# Patient Record
Sex: FEMALE | Race: WHITE | NOT HISPANIC OR LATINO | Employment: FULL TIME | URBAN - METROPOLITAN AREA
[De-identification: names, ages, dates, MRNs, and addresses within clinical notes are randomized per-mention and may not be internally consistent; named-entity substitution may affect disease eponyms.]

---

## 2024-09-12 ENCOUNTER — APPOINTMENT (OUTPATIENT)
Dept: LAB | Facility: CLINIC | Age: 34
End: 2024-09-12

## 2024-09-12 DIAGNOSIS — Z11.1 TUBERCULOSIS SCREENING: ICD-10-CM

## 2024-09-12 DIAGNOSIS — Z02.1 PRE-EMPLOYMENT EXAMINATION: ICD-10-CM

## 2024-09-12 PROCEDURE — 86480 TB TEST CELL IMMUN MEASURE: CPT

## 2024-09-12 PROCEDURE — 36415 COLL VENOUS BLD VENIPUNCTURE: CPT

## 2024-09-13 LAB
GAMMA INTERFERON BACKGROUND BLD IA-ACNC: 0.01 IU/ML
M TB IFN-G BLD-IMP: NEGATIVE
M TB IFN-G CD4+ BCKGRND COR BLD-ACNC: 0 IU/ML
M TB IFN-G CD4+ BCKGRND COR BLD-ACNC: 0 IU/ML
MITOGEN IGNF BCKGRD COR BLD-ACNC: 9.99 IU/ML

## 2024-09-16 ENCOUNTER — OFFICE VISIT (OUTPATIENT)
Dept: URGENT CARE | Facility: MEDICAL CENTER | Age: 34
End: 2024-09-16
Payer: COMMERCIAL

## 2024-09-16 VITALS
HEART RATE: 82 BPM | TEMPERATURE: 97.9 F | BODY MASS INDEX: 51.44 KG/M2 | OXYGEN SATURATION: 97 % | SYSTOLIC BLOOD PRESSURE: 110 MMHG | WEIGHT: 263.4 LBS | RESPIRATION RATE: 18 BRPM | DIASTOLIC BLOOD PRESSURE: 87 MMHG

## 2024-09-16 DIAGNOSIS — J02.9 SORE THROAT: ICD-10-CM

## 2024-09-16 DIAGNOSIS — B34.9 VIRAL ILLNESS: Primary | ICD-10-CM

## 2024-09-16 LAB — S PYO AG THROAT QL: NEGATIVE

## 2024-09-16 PROCEDURE — 99212 OFFICE O/P EST SF 10 MIN: CPT | Performed by: PHYSICIAN ASSISTANT

## 2024-09-16 PROCEDURE — 87070 CULTURE OTHR SPECIMN AEROBIC: CPT | Performed by: PHYSICIAN ASSISTANT

## 2024-09-16 PROCEDURE — 87880 STREP A ASSAY W/OPTIC: CPT | Performed by: PHYSICIAN ASSISTANT

## 2024-09-16 NOTE — PROGRESS NOTES
[facility]        NAME: aKry Sandoval is a 34 y.o. female  : 1990    MRN: [id number]  DATE: 2024  TIME: 10:33 AM    Assessment and Plan   Viral illness [B34.9]  1. Viral illness        2. Sore throat  POCT rapid ANTIGEN strepA    Throat culture            Patient Instructions     If symptoms progress test yourself for Covid on day 3 of symptoms of later  Tylenol or Ibuprofen as needed for fever or pain  Drink plenty of fluids  Over the Counter cold medication to control symptoms  If symptoms fail to improve follow up with PCP  If symptoms worsen have yourself rechecked    Follow up with PCP in 3-5 days.  Proceed to  ER if symptoms worsen.    If tests have been performed at [facility], our office will contact you with results if changes need to be made to the care plan discussed with you at the visit.  [facility]    Chief Complaint     Chief Complaint   Patient presents with    Sore Throat     Sore throat, body aches, headache, symptoms started today         History of Present Illness       Patient presents with sore throat, cough, body aches and headaches which started this morning.  Son has similar symptoms.  Patient denies fever, runny, stuffy nose, nausea, vomiting or diarrhea.  Point-of-care strep test negative.        Review of Systems   Review of Systems   Constitutional:  Negative for fever.   HENT:  Positive for sore throat. Negative for congestion and rhinorrhea.    Respiratory:  Positive for cough.    Gastrointestinal:  Negative for diarrhea, nausea and vomiting.   Musculoskeletal:  Positive for myalgias.   Neurological:  Positive for headaches.         Current Medications       Current Outpatient Medications:     acetaminophen (TYLENOL) 500 mg tablet, Take 1 tablet (500 mg total) by mouth every 6 (six) hours as needed for mild pain, Disp: 30 tablet, Rfl: 0    albuterol (2.5 mg/3 mL) 0.083 % nebulizer solution, Take 3 mL (2.5 mg  total) by nebulization every 6 (six) hours as needed for wheezing or shortness of breath (Patient not taking: Reported on 9/16/2024), Disp: 90 mL, Rfl: 0    albuterol (Ventolin HFA) 90 mcg/act inhaler, Inhale 2 puffs every 6 (six) hours as needed for wheezing (Patient not taking: Reported on 9/16/2024), Disp: 18 g, Rfl: 0    guaiFENesin (MUCINEX) 600 mg 12 hr tablet, Take 1,200 mg by mouth every 12 (twelve) hours (Patient not taking: Reported on 9/16/2024), Disp: , Rfl:     methocarbamol (ROBAXIN) 750 mg tablet, Take 1 tablet (750 mg total) by mouth every 8 (eight) hours as needed for muscle spasms (Patient not taking: Reported on 9/16/2024), Disp: 21 tablet, Rfl: 0    methylPREDNISolone 4 MG tablet therapy pack, Use as directed on package (Patient not taking: Reported on 9/16/2024), Disp: 21 tablet, Rfl: 0    naproxen (Naprosyn) 500 mg tablet, Take 1 tablet (500 mg total) by mouth 2 (two) times a day with meals (Patient not taking: Reported on 2/1/2024), Disp: 30 tablet, Rfl: 0    Current Allergies     Allergies as of 09/16/2024 - Reviewed 09/16/2024   Allergen Reaction Noted    Penicillins Anaphylaxis 09/27/2016    Fish-derived products - food allergy  04/28/2017    Shellfish allergy - food allergy Hives 04/28/2017            The following portions of the patient's history were reviewed and updated as appropriate: allergies, current medications, past family history, past medical history, past social history, past surgical history and problem list.     Past Medical History:   Diagnosis Date    Arthritis     Depressed        Past Surgical History:   Procedure Laterality Date    CHOLANGIOGRAM N/A 09/21/2019    Procedure: CHOLANGIOGRAM;  Surgeon: Eliot Ramesh MD;  Location: [facility];  Service: General    CHOLECYSTECTOMY LAPAROSCOPIC N/A 09/21/2019    Procedure: CHOLECYSTECTOMY LAPAROSCOPIC;  Surgeon: Eliot Ramesh MD;  Location: [facility];  Service: General    FRACTURE SURGERY         Family History    Problem Relation Age of Onset    Cancer Maternal Uncle     Cancer Maternal Grandfather     Cancer Paternal Grandmother          Medications have been verified.        Objective   /87   Pulse 82   Temp 97.9 °F (36.6 °C)   Resp 18   Wt 119 kg (263 lb 6.4 oz)   SpO2 97%   BMI 51.44 kg/m²   No LMP recorded.       Physical Exam     Physical Exam  Vitals and nursing note reviewed.   Constitutional:       Appearance: She is well-developed.   HENT:      Head: Normocephalic and atraumatic.      Right Ear: Tympanic membrane normal.      Left Ear: Tympanic membrane normal.      Mouth/Throat:      Mouth: Mucous membranes are moist.   Eyes:      Conjunctiva/sclera: Conjunctivae normal.   Cardiovascular:      Rate and Rhythm: Normal rate and regular rhythm.      Heart sounds: Normal heart sounds.   Pulmonary:      Effort: Pulmonary effort is normal.      Breath sounds: Normal breath sounds.   Musculoskeletal:      Cervical back: Neck supple.   Lymphadenopathy:      Cervical: No cervical adenopathy.   Skin:     General: Skin is warm.   Neurological:      Mental Status: She is alert.

## 2024-09-16 NOTE — PATIENT INSTRUCTIONS
If symptoms progress test yourself for Covid on day 3 of symptoms of later  Tylenol or Ibuprofen as needed for fever or pain  Drink plenty of fluids  Over the Counter cold medication to control symptoms  If symptoms fail to improve follow up with PCP  If symptoms worsen have yourself rechecked    If tests have been performed at [facility], our office will contact you with results if changes need to be made to the care plan discussed with you at the visit.  You can review your full results on [facility]

## 2024-09-18 LAB — BACTERIA THROAT CULT: NORMAL

## 2024-10-20 ENCOUNTER — OFFICE VISIT (OUTPATIENT)
Dept: URGENT CARE | Facility: MEDICAL CENTER | Age: 34
End: 2024-10-20
Payer: COMMERCIAL

## 2024-10-20 VITALS
SYSTOLIC BLOOD PRESSURE: 118 MMHG | OXYGEN SATURATION: 99 % | WEIGHT: 272 LBS | TEMPERATURE: 98.4 F | RESPIRATION RATE: 20 BRPM | DIASTOLIC BLOOD PRESSURE: 78 MMHG | HEART RATE: 85 BPM | BODY MASS INDEX: 53.12 KG/M2

## 2024-10-20 DIAGNOSIS — B34.9 VIRAL ILLNESS: Primary | ICD-10-CM

## 2024-10-20 PROCEDURE — 99212 OFFICE O/P EST SF 10 MIN: CPT | Performed by: PHYSICIAN ASSISTANT

## 2024-10-20 NOTE — PROGRESS NOTES
[facility]        NAME: Kary Sandoval is a 34 y.o. female  : 1990    MRN: [id number]  DATE: 2024  TIME: 12:23 PM    Assessment and Plan   Viral illness [B34.9]  1. Viral illness              Patient Instructions     Tylenol or Ibuprofen as needed for fever or pain  Drink plenty of fluids  Over the Counter cold medication to control symptoms  If symptoms fail to improve follow up with PCP  If symptoms worsen have yourself rechecked    Follow up with PCP in 3-5 days.  Proceed to  ER if symptoms worsen.    If tests have been performed at [facility], our office will contact you with results if changes need to be made to the care plan discussed with you at the visit.  [facility]    Chief Complaint     Chief Complaint   Patient presents with    Cough     Feeling cold inside and body aches for 1 week , started with cough and congestion   and headache 2 days ago.         History of Present Illness       Patient presents with a 1 week history of bodyaches.  2 days ago she began with nasal congestion, sore throat, dry cough and headache.  Patient denies fever, chills, runny nose, shortness of breath, wheezing, nausea, vomiting or diarrhea.        Review of Systems   Review of Systems   Constitutional:  Negative for chills and fever.   HENT:  Positive for congestion and sore throat. Negative for rhinorrhea.    Respiratory:  Positive for cough. Negative for shortness of breath and wheezing.    Gastrointestinal:  Negative for diarrhea, nausea and vomiting.   Musculoskeletal:  Positive for myalgias.   Neurological:  Positive for headaches.         Current Medications       Current Outpatient Medications:     acetaminophen (TYLENOL) 500 mg tablet, Take 1 tablet (500 mg total) by mouth every 6 (six) hours as needed for mild pain, Disp: 30 tablet, Rfl: 0    albuterol (2.5 mg/3 mL) 0.083 % nebulizer solution, Take 3 mL (2.5 mg total) by nebulization every 6  (six) hours as needed for wheezing or shortness of breath (Patient not taking: Reported on 9/16/2024), Disp: 90 mL, Rfl: 0    albuterol (Ventolin HFA) 90 mcg/act inhaler, Inhale 2 puffs every 6 (six) hours as needed for wheezing (Patient not taking: Reported on 9/16/2024), Disp: 18 g, Rfl: 0    guaiFENesin (MUCINEX) 600 mg 12 hr tablet, Take 1,200 mg by mouth every 12 (twelve) hours (Patient not taking: Reported on 9/16/2024), Disp: , Rfl:     methocarbamol (ROBAXIN) 750 mg tablet, Take 1 tablet (750 mg total) by mouth every 8 (eight) hours as needed for muscle spasms (Patient not taking: Reported on 9/16/2024), Disp: 21 tablet, Rfl: 0    methylPREDNISolone 4 MG tablet therapy pack, Use as directed on package (Patient not taking: Reported on 9/16/2024), Disp: 21 tablet, Rfl: 0    naproxen (Naprosyn) 500 mg tablet, Take 1 tablet (500 mg total) by mouth 2 (two) times a day with meals (Patient not taking: Reported on 2/1/2024), Disp: 30 tablet, Rfl: 0    Current Allergies     Allergies as of 10/20/2024 - Reviewed 10/20/2024   Allergen Reaction Noted    Penicillins Anaphylaxis 09/27/2016    Ibuprofen Itching 10/20/2024    Fish-derived products - food allergy  04/28/2017    Shellfish allergy - food allergy Hives 04/28/2017            The following portions of the patient's history were reviewed and updated as appropriate: allergies, current medications, past family history, past medical history, past social history, past surgical history and problem list.     Past Medical History:   Diagnosis Date    Arthritis     Depressed        Past Surgical History:   Procedure Laterality Date    CHOLANGIOGRAM N/A 09/21/2019    Procedure: CHOLANGIOGRAM;  Surgeon: Eliot Ramesh MD;  Location: [facility];  Service: General    CHOLECYSTECTOMY LAPAROSCOPIC N/A 09/21/2019    Procedure: CHOLECYSTECTOMY LAPAROSCOPIC;  Surgeon: Eliot Ramesh MD;  Location: [facility];  Service: General    FRACTURE SURGERY         Family History    Problem Relation Age of Onset    Cancer Maternal Uncle     Cancer Maternal Grandfather     Cancer Paternal Grandmother          Medications have been verified.        Objective   /78 (BP Location: Right arm, Patient Position: Sitting, Cuff Size: Large)   Pulse 85   Temp 98.4 °F (36.9 °C) (Temporal)   Resp 20   Wt 123 kg (272 lb)   LMP 10/13/2024 (Approximate)   SpO2 99%   BMI 53.12 kg/m²   Patient's last menstrual period was 10/13/2024 (approximate).       Physical Exam     Physical Exam  Vitals and nursing note reviewed.   Constitutional:       Appearance: Normal appearance.   HENT:      Head: Normocephalic and atraumatic.      Right Ear: Tympanic membrane normal.      Left Ear: Tympanic membrane normal.      Mouth/Throat:      Mouth: Mucous membranes are moist.      Pharynx: Oropharynx is clear.   Eyes:      Conjunctiva/sclera: Conjunctivae normal.   Cardiovascular:      Rate and Rhythm: Normal rate and regular rhythm.      Heart sounds: Normal heart sounds.   Pulmonary:      Effort: Pulmonary effort is normal.      Breath sounds: Normal breath sounds.   Musculoskeletal:      Cervical back: Neck supple.   Lymphadenopathy:      Cervical: No cervical adenopathy.   Skin:     General: Skin is warm.   Neurological:      Mental Status: She is alert.

## 2024-11-03 ENCOUNTER — OFFICE VISIT (OUTPATIENT)
Dept: URGENT CARE | Facility: CLINIC | Age: 34
End: 2024-11-03
Payer: COMMERCIAL

## 2024-11-03 VITALS
SYSTOLIC BLOOD PRESSURE: 139 MMHG | OXYGEN SATURATION: 99 % | HEART RATE: 89 BPM | BODY MASS INDEX: 53.6 KG/M2 | DIASTOLIC BLOOD PRESSURE: 65 MMHG | WEIGHT: 273 LBS | RESPIRATION RATE: 20 BRPM | HEIGHT: 60 IN | TEMPERATURE: 98.3 F

## 2024-11-03 DIAGNOSIS — M77.8 LEFT WRIST TENDONITIS: Primary | ICD-10-CM

## 2024-11-03 PROCEDURE — 99213 OFFICE O/P EST LOW 20 MIN: CPT | Performed by: NURSE PRACTITIONER

## 2024-11-03 RX ORDER — PREDNISONE 10 MG/1
TABLET ORAL
Qty: 30 TABLET | Refills: 0 | Status: SHIPPED | OUTPATIENT
Start: 2024-11-03

## 2024-11-03 RX ORDER — PREDNISONE 20 MG/1
40 TABLET ORAL ONCE
Status: COMPLETED | OUTPATIENT
Start: 2024-11-03 | End: 2024-11-03

## 2024-11-03 RX ADMIN — PREDNISONE 40 MG: 20 TABLET ORAL at 18:20

## 2024-11-03 NOTE — PROGRESS NOTES
[facility]        NAME: Kary Sandoval is a 34 y.o. female  : 1990    MRN: [id number]  DATE: November 3, 2024  TIME: 6:19 PM      Assessment and Plan     Left wrist tendonitis [M77.8]  1. Left wrist tendonitis  Ambulatory Referral to Orthopedic Surgery    predniSONE 10 mg tablet    predniSONE tablet 40 mg        Patient fitted for as needed wrist splint by nursing, tolerated well.    Patient Instructions     Patient Instructions   Patient Education     Common wrist injuries   The Basics   Written by the doctors and editors at [facility]   What are common types of wrist injuries? -- Common types of wrist injuries include:   Sprains - This is when a ligament in the wrist tears or gets stretched too much. Ligaments are tough bands of tissue that connect bones to other bones.   Fractures - This means a broken bone. There are different kinds of wrist fractures. Some involve a break in 1 of the small bones in the wrist. Others involve a break in 1 of the forearm bones near the wrist (figure 1). A common type of wrist fracture involves the end of 1 of the forearm bones. It can happen when a person falls onto their outstretched hand.   Repetitive use injuries - These are caused by doing the same motion over and over.  What are the symptoms of a wrist injury? -- Wrist injuries can cause:   Pain   Swelling   Bruising   Stiffness   Weakness in the wrist or arm   The wrist or arm to look bent  Sprains usually cause less pain and swelling than fractures, but not always.  Will I need tests? -- Probably. Your doctor or nurse will ask about your symptoms and do an exam. They will also ask about how you got hurt, like whether you fell and how you landed. To check for a fracture, the doctor can do an X-ray. They might also do an imaging test such as a CT or MRI scan. Imaging tests create pictures of the inside of the body.  How are wrist injuries treated? -- Treatment depends on the type of injury you have and how  severe it is.  If you have a lot of pain or a severe injury, your doctor will prescribe a strong pain medicine. If your injury is mild, your doctor might recommend that you take an over-the-counter pain medicine. Over-the-counter medicines include acetaminophen (sample brand name: Tylenol), ibuprofen (sample brand names: Advil, Motrin), and naproxen (sample brand name: Aleve).  Treatment for a wrist injury can include 1 or more of the following:   Resting your wrist   Keeping your wrist raised above the level of your heart (when possible)   Putting ice on your wrist - To reduce swelling, you can put a cold gel pack, bag of ice, or bag of frozen vegetables on your wrist every 1 to 2 hours, for 15 minutes each time. Put a thin towel between the ice (or other cold object) and your skin. You should put the ice (or other cold object) on your wrist for at least 6 hours after your injury. Some people find it helpful to ice longer, even up to 2 days after their injury.   Wearing an elastic bandage (such as an ACE wrap)   Wearing a splint - Splints can be used to limit movement after a sprain or a repetitive use injury. They might also be used while waiting for swelling to get better before putting on a cast.   Wearing a cast - Casts are usually used to treat fractures. Before your doctor puts the cast on your wrist, they will make sure that your bone is in the correct position. If your bone is not in the correct position, your doctor might do a procedure or surgery to put your bone in the correct position.   Surgery - Most people with a wrist injury do not need surgery. But people who tore a ligament might need surgery to fix the ligament.   Physical therapy - After your injury has healed, your doctor might recommend that you work with a physical therapist (exercise expert). They can teach you exercises to strengthen your muscles and help your joints move more easily.  How long do wrist injuries take to heal? -- It depends  "partly on the type of injury and how severe it is. A mild sprain takes about 1 to 2 weeks to heal. Fractures and repetitive use injuries can take weeks to months to heal.  It also depends on the person. Healthy children usually heal very quickly. Older adults or adults with other medical problems can take much longer to heal.  How can I improve the healing process? -- Follow all of your doctor's instructions while your injury is healing. They might recommend that you eat a healthy diet that includes enough calcium, vitamin D, and protein (figure 2). They will also probably recommend that you avoid doing certain things. For example, they might recommend that you:   Avoid doing certain physical activities.   Avoid smoking - A fracture can take longer to heal if you smoke.   Avoid damaging your cast or getting it wet, if you have a cast that shouldn't get wet.  When should I call the doctor? -- Your doctor or nurse will tell you when to call. In general, call your doctor or nurse if:   You have severe pain, or your pain or swelling gets worse.   You have numbness or tingling in your fingers, or your fingers look blue or purple.   You damage your cast or splint or get it wet, and it's not supposed to get wet.  All topics are updated as new evidence becomes available and our peer review process is complete.  This topic retrieved from [employer] on: Mar 13, 2024.  Topic 52150 Version 9.0  Release: 32.2.4 - C32.71  © 2024 UpToDate, Inc. and/or its affiliates. All rights reserved.  figure 1: Hand and wrist bones     This drawing shows the bones of the hand and wrist.  Graphic 53748 Version 2.0  figure 2: Foods and drinks with calcium and vitamin D     Foods rich in calcium include ice cream, soy milk, breads, kale, broccoli, milk, cheese, cottage cheese, almonds, yogurt, ready-to-eat cereals, beans, and tofu. Foods rich in vitamin D include milk, fortified plant-based \"milks\" (soy, almond), canned tuna fish, cod liver oil, " yogurt, ready-to-eat-cereals, cooked salmon, canned sardines, mackerel, and eggs. Some of these foods are rich in both.  Graphic 64967 Version 4.0  Consumer Information Use and Disclaimer   Disclaimer: This generalized information is a limited summary of diagnosis, treatment, and/or medication information. It is not meant to be comprehensive and should be used as a tool to help the user understand and/or assess potential diagnostic and treatment options. It does NOT include all information about conditions, treatments, medications, side effects, or risks that may apply to a specific patient. It is not intended to be medical advice or a substitute for the medical advice, diagnosis, or treatment of a health care provider based on the health care provider's examination and assessment of a patient's specific and unique circumstances. Patients must speak with a health care provider for complete information about their health, medical questions, and treatment options, including any risks or benefits regarding use of medications. This information does not endorse any treatments or medications as safe, effective, or approved for treating a specific patient. UpToDate, Inc. and its affiliates disclaim any warranty or liability relating to this information or the use thereof.The use of this information is governed by the Terms of Use, available at https://www.[employer].com/en/know/clinical-effectiveness-terms. 2024© UpToDate, Inc. and its affiliates and/or licensors. All rights reserved.  Copyright   © 2024 UpToDate, Inc. and/or its affiliates. All rights reserved.    Patient Education     Tendinopathy   About this topic   A tendon is a thick cord that attaches muscle to bone. Tendinopathy is an injury to the tendon. There may be irritation or small tears to the tendon. There are different types of tendinopathy.  Tendinosis ? Is a problem when the tendons break down and likely tear over time. The structure of the tendon,  in fact, changes. There is no swelling or inflammation present.  Tenosynovitis ? The thin cover around a tendon, called the tendon sheath, becomes swollen.  Calcific tendinopathy ? Calcium deposits form in the tendon. This is most common in the shoulder or rotator cuff tendons.  This condition may happen in any tendon. It often happens in the hand, shoulder, elbow, or wrist. Some people have problems in their knee or the back of the heel.  What are the causes?   Injury  Using the tendon over and over  Infection  Some drugs  What can make this more likely to happen?   Things that lead to tendinopathy are:  Age ? As people get older, their tendons become less flexible.  Work ? More common with work that uses repeated motions, awkward positions, frequent reaching, and forceful effort.  Sports ? Doing the same moves done over and over like those used in baseball, basketball, bowling, golf, and tennis.  Other things ? Being heavy, certain illnesses like diabetes or rheumatoid arthritis.  What are the main signs?   You may feel:  Pain and soreness along a tendon, most often near a joint. It is worse with added weight, movement, and often happens at night.  Pain is severe or sharp in early tendinopathy and then is more of a dull ache in later stages of tendinopathy.  A grinding feeling when you move the tendon  Lack of strength due to pain  Stiffness or problems moving the tendon. This is often worse in the morning.  A lump on the tendon  Range of motion may be limited  Muscle wasting or weakness  The skin over the tendon may be swollen, red, and warm as well.  How does the doctor diagnose this health problem?   Your doctor will do an exam and take your history. The doctor may push, pull, feel, and move the sore part. You may need to have tests like:  X-ray to show if there are calcium deposits  CT or MRI  Ultrasound  Lab tests  How does the doctor treat this health problem?   Rest the painful part  Place an ice pack or a  bag of frozen peas wrapped in a towel over the painful part. Never put ice right on the skin. Do not leave the ice on more than 10 to 15 minutes at a time.  Compress or wrap the swollen part to help ease pain. The doctor may give you a brace or strap to wear.  Prop the sore part on pillows.  Skip any action that makes the pain worse.  Your doctor may suggest an exercise program to build muscle strength. You may need to see a physical therapist.  Your doctor may use a sling or splint to keep the affected part from moving.  You may be told to do a special kind of massage on the sore part. This is a cross friction massage.  You may be told to do ice therapy over the tendon. This is done by freezing water in a styrofoam or paper cup. Then you rub the area over the sore tendon with the ice for 5 to 10 minutes up to 3 to 5 times a day.  What drugs may be needed?   The doctor may order drugs to:  Help with pain and swelling  Prevent an infection if you had surgery  The doctor may give you a shot of an anti-inflammatory drug called a corticosteroid. This will help with swelling. Talk with your doctor about the risks of this shot.  What problems could happen?   Tendon ruptures or fully breaks apart  Ongoing pain  Problem comes back  Problems doing activities  More weakness or stiffness  What can be done to prevent this health problem?   Take breaks often when doing things that use the same movement over and over.  Stay active and work out to keep your muscles strong and flexible.  Warm up slowly and stretch before you work out. Use good ways to train, such as slowly adding to how far you run. Do not work out if you are overly tired. Take extra care if working out in cold weather.  Keep a healthy weight. Being heavy puts more stress on your joints. This makes them more likely to be hurt.  Wear braces or straps during activities if you get the same problem over and over.  Last Reviewed Date   2020-10-21  Consumer Information  Use and Disclaimer   This generalized information is a limited summary of diagnosis, treatment, and/or medication information. It is not meant to be comprehensive and should be used as a tool to help the user understand and/or assess potential diagnostic and treatment options. It does NOT include all information about conditions, treatments, medications, side effects, or risks that may apply to a specific patient. It is not intended to be medical advice or a substitute for the medical advice, diagnosis, or treatment of a health care provider based on the health care provider's examination and assessment of a patient’s specific and unique circumstances. Patients must speak with a health care provider for complete information about their health, medical questions, and treatment options, including any risks or benefits regarding use of medications. This information does not endorse any treatments or medications as safe, effective, or approved for treating a specific patient. UpToDate, Inc. and its affiliates disclaim any warranty or liability relating to this information or the use thereof. The use of this information is governed by the Terms of Use, available at https://www.[employer]ETF.com/en/know/clinical-effectiveness-terms   Copyright   Copyright © 2024 UpToDate, Inc. and its affiliates and/or licensors. All rights reserved.      Follow up with PCP in 3-5 days.  Proceed to  ER if symptoms worsen.    Chief Complaint     Chief Complaint   Patient presents with    Wrist Pain     Left hand and wrist pain with swelling since today upon waking. No injury noted.         History of Present Illness     Sudden onset left wrist pain along lateral top aspect that will radiate down to hand and up forearm.  No overt injury.  No hx of this.  Right hand dominant.  Works as a home health aid.  Tried taking tylenol--helped just a little.        Review of Systems     Review of Systems   Musculoskeletal:  Positive for arthralgias  and joint swelling.   All other systems reviewed and are negative.        Current Medications       Current Outpatient Medications:     acetaminophen (TYLENOL) 500 mg tablet, Take 1 tablet (500 mg total) by mouth every 6 (six) hours as needed for mild pain, Disp: 30 tablet, Rfl: 0    predniSONE 10 mg tablet, 5 tabs daily x 2 days, 4 tabs daily x 2 days, 3 tabs daily x 2 days, 2 tabs daily x 2 days, 1 tab daily x 2 days, Disp: 30 tablet, Rfl: 0    albuterol (2.5 mg/3 mL) 0.083 % nebulizer solution, Take 3 mL (2.5 mg total) by nebulization every 6 (six) hours as needed for wheezing or shortness of breath (Patient not taking: Reported on 9/16/2024), Disp: 90 mL, Rfl: 0    albuterol (Ventolin HFA) 90 mcg/act inhaler, Inhale 2 puffs every 6 (six) hours as needed for wheezing (Patient not taking: Reported on 9/16/2024), Disp: 18 g, Rfl: 0    guaiFENesin (MUCINEX) 600 mg 12 hr tablet, Take 1,200 mg by mouth every 12 (twelve) hours (Patient not taking: Reported on 9/16/2024), Disp: , Rfl:     methocarbamol (ROBAXIN) 750 mg tablet, Take 1 tablet (750 mg total) by mouth every 8 (eight) hours as needed for muscle spasms (Patient not taking: Reported on 9/16/2024), Disp: 21 tablet, Rfl: 0    naproxen (Naprosyn) 500 mg tablet, Take 1 tablet (500 mg total) by mouth 2 (two) times a day with meals (Patient not taking: Reported on 2/1/2024), Disp: 30 tablet, Rfl: 0    Current Facility-Administered Medications:     predniSONE tablet 40 mg, 40 mg, Oral, Once,     Current Allergies     Allergies as of 11/03/2024 - Reviewed 11/03/2024   Allergen Reaction Noted    Penicillins Anaphylaxis 09/27/2016    Ibuprofen Itching 10/20/2024    Fish-derived products - food allergy  04/28/2017    Shellfish allergy - food allergy Hives 04/28/2017              The following portions of the patient's history were reviewed and updated as appropriate: allergies, current medications, past family history, past medical history, past social history, past  surgical history and problem list.     Past Medical History:   Diagnosis Date    Arthritis     Depressed        Past Surgical History:   Procedure Laterality Date    CHOLANGIOGRAM N/A 09/21/2019    Procedure: CHOLANGIOGRAM;  Surgeon: Eliot Ramesh MD;  Location: [facility];  Service: General    CHOLECYSTECTOMY LAPAROSCOPIC N/A 09/21/2019    Procedure: CHOLECYSTECTOMY LAPAROSCOPIC;  Surgeon: Eliot Ramesh MD;  Location: [facility];  Service: General    FRACTURE SURGERY         Family History   Problem Relation Age of Onset    Cancer Maternal Uncle     Cancer Maternal Grandfather     Cancer Paternal Grandmother          Medications have been verified.        Objective     /65   Pulse 89   Temp 98.3 °F (36.8 °C)   Resp 20   Ht 5' (1.524 m)   Wt 124 kg (273 lb)   LMP 10/13/2024 (Approximate)   SpO2 99%   BMI 53.32 kg/m²   Patient's last menstrual period was 10/13/2024 (approximate).         Physical Exam     Physical Exam  Vitals and nursing note reviewed.   Constitutional:       General: She is not in acute distress.     Appearance: Normal appearance. She is well-developed and well-groomed. She is not ill-appearing, toxic-appearing or diaphoretic.   HENT:      Head: Normocephalic and atraumatic.   Pulmonary:      Effort: Pulmonary effort is normal. No respiratory distress.   Musculoskeletal:         General: Swelling and tenderness present. No deformity or signs of injury. Normal range of motion.      Left wrist: Swelling (trace) and tenderness present. No bony tenderness. Normal range of motion (but ROM increases pain, especially flexion; only slight increase with extension).        Arms:    Skin:     General: Skin is warm and dry.      Capillary Refill: Capillary refill takes less than 2 seconds.   Neurological:      General: No focal deficit present.      Mental Status: She is alert and oriented to person, place, and time.   Psychiatric:         Mood and Affect: Mood and affect normal.          Behavior: Behavior normal. Behavior is cooperative.         Thought Content: Thought content normal.         Judgment: Judgment normal.

## 2024-11-03 NOTE — PATIENT INSTRUCTIONS
Patient Education     Common wrist injuries   The Basics   Written by the doctors and editors at [facility]   What are common types of wrist injuries? -- Common types of wrist injuries include:   Sprains - This is when a ligament in the wrist tears or gets stretched too much. Ligaments are tough bands of tissue that connect bones to other bones.   Fractures - This means a broken bone. There are different kinds of wrist fractures. Some involve a break in 1 of the small bones in the wrist. Others involve a break in 1 of the forearm bones near the wrist (figure 1). A common type of wrist fracture involves the end of 1 of the forearm bones. It can happen when a person falls onto their outstretched hand.   Repetitive use injuries - These are caused by doing the same motion over and over.  What are the symptoms of a wrist injury? -- Wrist injuries can cause:   Pain   Swelling   Bruising   Stiffness   Weakness in the wrist or arm   The wrist or arm to look bent  Sprains usually cause less pain and swelling than fractures, but not always.  Will I need tests? -- Probably. Your doctor or nurse will ask about your symptoms and do an exam. They will also ask about how you got hurt, like whether you fell and how you landed. To check for a fracture, the doctor can do an X-ray. They might also do an imaging test such as a CT or MRI scan. Imaging tests create pictures of the inside of the body.  How are wrist injuries treated? -- Treatment depends on the type of injury you have and how severe it is.  If you have a lot of pain or a severe injury, your doctor will prescribe a strong pain medicine. If your injury is mild, your doctor might recommend that you take an over-the-counter pain medicine. Over-the-counter medicines include acetaminophen (sample brand name: Tylenol), ibuprofen (sample brand names: Advil, Motrin), and naproxen (sample brand name: Aleve).  Treatment for a wrist injury can include 1 or more of the following:    Resting your wrist   Keeping your wrist raised above the level of your heart (when possible)   Putting ice on your wrist - To reduce swelling, you can put a cold gel pack, bag of ice, or bag of frozen vegetables on your wrist every 1 to 2 hours, for 15 minutes each time. Put a thin towel between the ice (or other cold object) and your skin. You should put the ice (or other cold object) on your wrist for at least 6 hours after your injury. Some people find it helpful to ice longer, even up to 2 days after their injury.   Wearing an elastic bandage (such as an ACE wrap)   Wearing a splint - Splints can be used to limit movement after a sprain or a repetitive use injury. They might also be used while waiting for swelling to get better before putting on a cast.   Wearing a cast - Casts are usually used to treat fractures. Before your doctor puts the cast on your wrist, they will make sure that your bone is in the correct position. If your bone is not in the correct position, your doctor might do a procedure or surgery to put your bone in the correct position.   Surgery - Most people with a wrist injury do not need surgery. But people who tore a ligament might need surgery to fix the ligament.   Physical therapy - After your injury has healed, your doctor might recommend that you work with a physical therapist (exercise expert). They can teach you exercises to strengthen your muscles and help your joints move more easily.  How long do wrist injuries take to heal? -- It depends partly on the type of injury and how severe it is. A mild sprain takes about 1 to 2 weeks to heal. Fractures and repetitive use injuries can take weeks to months to heal.  It also depends on the person. Healthy children usually heal very quickly. Older adults or adults with other medical problems can take much longer to heal.  How can I improve the healing process? -- Follow all of your doctor's instructions while your injury is healing. They might  "recommend that you eat a healthy diet that includes enough calcium, vitamin D, and protein (figure 2). They will also probably recommend that you avoid doing certain things. For example, they might recommend that you:   Avoid doing certain physical activities.   Avoid smoking - A fracture can take longer to heal if you smoke.   Avoid damaging your cast or getting it wet, if you have a cast that shouldn't get wet.  When should I call the doctor? -- Your doctor or nurse will tell you when to call. In general, call your doctor or nurse if:   You have severe pain, or your pain or swelling gets worse.   You have numbness or tingling in your fingers, or your fingers look blue or purple.   You damage your cast or splint or get it wet, and it's not supposed to get wet.  All topics are updated as new evidence becomes available and our peer review process is complete.  This topic retrieved from [employer] on: Mar 13, 2024.  Topic 27740 Version 9.0  Release: 32.2.4 - C32.71  © 2024 UpToDate, Inc. and/or its affiliates. All rights reserved.  figure 1: Hand and wrist bones     This drawing shows the bones of the hand and wrist.  Graphic 29521 Version 2.0  figure 2: Foods and drinks with calcium and vitamin D     Foods rich in calcium include ice cream, soy milk, breads, kale, broccoli, milk, cheese, cottage cheese, almonds, yogurt, ready-to-eat cereals, beans, and tofu. Foods rich in vitamin D include milk, fortified plant-based \"milks\" (soy, almond), canned tuna fish, cod liver oil, yogurt, ready-to-eat-cereals, cooked salmon, canned sardines, mackerel, and eggs. Some of these foods are rich in both.  Graphic 84533 Version 4.0  Consumer Information Use and Disclaimer   Disclaimer: This generalized information is a limited summary of diagnosis, treatment, and/or medication information. It is not meant to be comprehensive and should be used as a tool to help the user understand and/or assess potential diagnostic and treatment " options. It does NOT include all information about conditions, treatments, medications, side effects, or risks that may apply to a specific patient. It is not intended to be medical advice or a substitute for the medical advice, diagnosis, or treatment of a health care provider based on the health care provider's examination and assessment of a patient's specific and unique circumstances. Patients must speak with a health care provider for complete information about their health, medical questions, and treatment options, including any risks or benefits regarding use of medications. This information does not endorse any treatments or medications as safe, effective, or approved for treating a specific patient. UpToDate, Inc. and its affiliates disclaim any warranty or liability relating to this information or the use thereof.The use of this information is governed by the Terms of Use, available at https://www.[employer]i/en/know/clinical-effectiveness-terms. 2024© UpToDate, Inc. and its affiliates and/or licensors. All rights reserved.  Copyright   © 2024 UpToDate, Inc. and/or its affiliates. All rights reserved.    Patient Education     Tendinopathy   About this topic   A tendon is a thick cord that attaches muscle to bone. Tendinopathy is an injury to the tendon. There may be irritation or small tears to the tendon. There are different types of tendinopathy.  Tendinosis ? Is a problem when the tendons break down and likely tear over time. The structure of the tendon, in fact, changes. There is no swelling or inflammation present.  Tenosynovitis ? The thin cover around a tendon, called the tendon sheath, becomes swollen.  Calcific tendinopathy ? Calcium deposits form in the tendon. This is most common in the shoulder or rotator cuff tendons.  This condition may happen in any tendon. It often happens in the hand, shoulder, elbow, or wrist. Some people have problems in their knee or the back of the heel.  What  are the causes?   Injury  Using the tendon over and over  Infection  Some drugs  What can make this more likely to happen?   Things that lead to tendinopathy are:  Age ? As people get older, their tendons become less flexible.  Work ? More common with work that uses repeated motions, awkward positions, frequent reaching, and forceful effort.  Sports ? Doing the same moves done over and over like those used in baseball, basketball, bowling, golf, and tennis.  Other things ? Being heavy, certain illnesses like diabetes or rheumatoid arthritis.  What are the main signs?   You may feel:  Pain and soreness along a tendon, most often near a joint. It is worse with added weight, movement, and often happens at night.  Pain is severe or sharp in early tendinopathy and then is more of a dull ache in later stages of tendinopathy.  A grinding feeling when you move the tendon  Lack of strength due to pain  Stiffness or problems moving the tendon. This is often worse in the morning.  A lump on the tendon  Range of motion may be limited  Muscle wasting or weakness  The skin over the tendon may be swollen, red, and warm as well.  How does the doctor diagnose this health problem?   Your doctor will do an exam and take your history. The doctor may push, pull, feel, and move the sore part. You may need to have tests like:  X-ray to show if there are calcium deposits  CT or MRI  Ultrasound  Lab tests  How does the doctor treat this health problem?   Rest the painful part  Place an ice pack or a bag of frozen peas wrapped in a towel over the painful part. Never put ice right on the skin. Do not leave the ice on more than 10 to 15 minutes at a time.  Compress or wrap the swollen part to help ease pain. The doctor may give you a brace or strap to wear.  Prop the sore part on pillows.  Skip any action that makes the pain worse.  Your doctor may suggest an exercise program to build muscle strength. You may need to see a physical  therapist.  Your doctor may use a sling or splint to keep the affected part from moving.  You may be told to do a special kind of massage on the sore part. This is a cross friction massage.  You may be told to do ice therapy over the tendon. This is done by freezing water in a styrofoam or paper cup. Then you rub the area over the sore tendon with the ice for 5 to 10 minutes up to 3 to 5 times a day.  What drugs may be needed?   The doctor may order drugs to:  Help with pain and swelling  Prevent an infection if you had surgery  The doctor may give you a shot of an anti-inflammatory drug called a corticosteroid. This will help with swelling. Talk with your doctor about the risks of this shot.  What problems could happen?   Tendon ruptures or fully breaks apart  Ongoing pain  Problem comes back  Problems doing activities  More weakness or stiffness  What can be done to prevent this health problem?   Take breaks often when doing things that use the same movement over and over.  Stay active and work out to keep your muscles strong and flexible.  Warm up slowly and stretch before you work out. Use good ways to train, such as slowly adding to how far you run. Do not work out if you are overly tired. Take extra care if working out in cold weather.  Keep a healthy weight. Being heavy puts more stress on your joints. This makes them more likely to be hurt.  Wear braces or straps during activities if you get the same problem over and over.  Last Reviewed Date   2020-10-21  Consumer Information Use and Disclaimer   This generalized information is a limited summary of diagnosis, treatment, and/or medication information. It is not meant to be comprehensive and should be used as a tool to help the user understand and/or assess potential diagnostic and treatment options. It does NOT include all information about conditions, treatments, medications, side effects, or risks that may apply to a specific patient. It is not intended to  be medical advice or a substitute for the medical advice, diagnosis, or treatment of a health care provider based on the health care provider's examination and assessment of a patient’s specific and unique circumstances. Patients must speak with a health care provider for complete information about their health, medical questions, and treatment options, including any risks or benefits regarding use of medications. This information does not endorse any treatments or medications as safe, effective, or approved for treating a specific patient. UpToDate, Inc. and its affiliates disclaim any warranty or liability relating to this information or the use thereof. The use of this information is governed by the Terms of Use, available at https://www.[employer].com/en/know/clinical-effectiveness-terms   Copyright   Copyright © 2024 UpToDate, Inc. and its affiliates and/or licensors. All rights reserved.

## 2024-11-07 ENCOUNTER — OFFICE VISIT (OUTPATIENT)
Dept: OBGYN CLINIC | Facility: CLINIC | Age: 34
End: 2024-11-07
Payer: COMMERCIAL

## 2024-11-07 ENCOUNTER — APPOINTMENT (OUTPATIENT)
Dept: RADIOLOGY | Facility: CLINIC | Age: 34
End: 2024-11-07
Payer: COMMERCIAL

## 2024-11-07 VITALS
TEMPERATURE: 99.4 F | DIASTOLIC BLOOD PRESSURE: 82 MMHG | SYSTOLIC BLOOD PRESSURE: 127 MMHG | HEART RATE: 89 BPM | WEIGHT: 276.8 LBS | HEIGHT: 60 IN | BODY MASS INDEX: 54.34 KG/M2

## 2024-11-07 DIAGNOSIS — M25.532 PAIN IN LEFT WRIST: ICD-10-CM

## 2024-11-07 DIAGNOSIS — S63.502A WRIST SPRAIN, LEFT, INITIAL ENCOUNTER: Primary | ICD-10-CM

## 2024-11-07 DIAGNOSIS — S69.92XA WRIST INJURY, LEFT, INITIAL ENCOUNTER: ICD-10-CM

## 2024-11-07 PROCEDURE — 99203 OFFICE O/P NEW LOW 30 MIN: CPT | Performed by: FAMILY MEDICINE

## 2024-11-07 PROCEDURE — 73110 X-RAY EXAM OF WRIST: CPT

## 2024-11-07 NOTE — PATIENT INSTRUCTIONS
F/u 2 wks  Continue wearing wrist brace take off 4x daily for range of motion exercises  Icing/heat/OTC pain meds as needed.  Begin occupational therapy

## 2024-11-07 NOTE — PROGRESS NOTES
[facility] [address] [id number] [phone]      Assessment:  1. Wrist sprain, left, initial encounter  -     XR wrist 3+ vw left; Future; Expected date: 11/07/2024  -     Ambulatory Referral to Occupational Therapy; Future  2. Wrist injury, left, initial encounter  -     Ambulatory Referral to Orthopedic Surgery  -     Ambulatory Referral to Occupational Therapy; Future      Plan:  Patient Instructions   F/u 2 wks  Continue wearing wrist brace take off 4x daily for range of motion exercises  Icing/heat/OTC pain meds as needed.  Begin occupational therapy   Return in about 2 weeks (around 11/21/2024) for Recheck.    Chief Complaint:  Chief Complaint   Patient presents with    Left Wrist - Pain       Subjective:   HPI    Patient ID: Kary Sandoval is a 34 y.o. female     Here c/o L wrist pain  Seen in .  Xr done. Note reviewed.  Pain for about 4 days  Woke up pain/swelling  No  hx of gout- no food night before  Denies injury/trauma  Worked the day before.  Works as a caregiver.  Tylenol PRN- doesn't help  Sharp pain  Worse with too much use- grabbing/twisting  Better at rest sometimes    Review of Systems   Constitutional:  Negative for fatigue and fever.   Respiratory:  Negative for shortness of breath.    Cardiovascular:  Negative for chest pain.   Gastrointestinal:  Negative for abdominal pain and nausea.   Genitourinary:  Negative for dysuria.   Musculoskeletal:  Positive for arthralgias.   Skin:  Negative for rash and wound.   Neurological:  Negative for weakness and headaches.       Objective:  Vitals:  /82 (BP Location: Left arm, Patient Position: Sitting, Cuff Size: Large)   Pulse 89   Temp 99.4 °F (37.4 °C) (Tympanic)   Ht 5' (1.524 m)   Wt 126 kg (276 lb 12.8 oz)   LMP 10/13/2024 (Approximate)   BMI 54.06 kg/m²     The following portions of the patient's history were reviewed and updated as appropriate:  allergies, current medications, past family history, past medical history, past social history, past surgical history, and problem list.    Physical exam:  Physical Exam  Constitutional:       Appearance: Normal appearance. She is normal weight.   HENT:      Head: Normocephalic.   Eyes:      Extraocular Movements: Extraocular movements intact.   Pulmonary:      Effort: Pulmonary effort is normal.   Musculoskeletal:         General: Tenderness present.      Cervical back: Normal range of motion.      Comments: L wrist- distal ulna TTP  Pain with resistance to pron/ext  NVI   Skin:     General: Skin is warm and dry.   Neurological:      General: No focal deficit present.      Mental Status: She is alert and oriented to person, place, and time. Mental status is at baseline.   Psychiatric:         Mood and Affect: Mood normal.         Behavior: Behavior normal.         Thought Content: Thought content normal.         Judgment: Judgment normal.       Ortho Exam    I have personally reviewed pertinent films in PACS and my interpretation is XR  L wrist- nml study. No fx.      Valentin Leyva MD

## 2024-11-22 ENCOUNTER — APPOINTMENT (EMERGENCY)
Dept: RADIOLOGY | Facility: HOSPITAL | Age: 34
End: 2024-11-22
Payer: COMMERCIAL

## 2024-11-22 ENCOUNTER — HOSPITAL ENCOUNTER (EMERGENCY)
Facility: HOSPITAL | Age: 34
Discharge: HOME | End: 2024-11-22
Attending: EMERGENCY MEDICINE
Payer: COMMERCIAL

## 2024-11-22 VITALS
DIASTOLIC BLOOD PRESSURE: 60 MMHG | WEIGHT: 275.13 LBS | BODY MASS INDEX: 53.73 KG/M2 | SYSTOLIC BLOOD PRESSURE: 125 MMHG | TEMPERATURE: 97.5 F | RESPIRATION RATE: 20 BRPM | OXYGEN SATURATION: 97 % | HEART RATE: 101 BPM

## 2024-11-22 DIAGNOSIS — S89.91XA INJURY OF RIGHT KNEE, INITIAL ENCOUNTER: Primary | ICD-10-CM

## 2024-11-22 PROCEDURE — 73564 X-RAY EXAM KNEE 4 OR MORE: CPT

## 2024-11-22 PROCEDURE — 99284 EMERGENCY DEPT VISIT MOD MDM: CPT

## 2024-11-22 PROCEDURE — 99284 EMERGENCY DEPT VISIT MOD MDM: CPT | Performed by: PHYSICIAN ASSISTANT

## 2024-11-22 PROCEDURE — 96372 THER/PROPH/DIAG INJ SC/IM: CPT

## 2024-11-22 RX ORDER — NAPROXEN 500 MG/1
500 TABLET ORAL 2 TIMES DAILY PRN
Qty: 8 TABLET | Refills: 0 | Status: SHIPPED | OUTPATIENT
Start: 2024-11-22

## 2024-11-22 RX ORDER — HYDROCODONE BITARTRATE AND ACETAMINOPHEN 5; 325 MG/1; MG/1
1 TABLET ORAL ONCE
Refills: 0 | Status: DISCONTINUED | OUTPATIENT
Start: 2024-11-22 | End: 2024-11-22

## 2024-11-22 RX ORDER — KETOROLAC TROMETHAMINE 30 MG/ML
15 INJECTION, SOLUTION INTRAMUSCULAR; INTRAVENOUS ONCE
Status: COMPLETED | OUTPATIENT
Start: 2024-11-22 | End: 2024-11-22

## 2024-11-22 RX ADMIN — KETOROLAC TROMETHAMINE 15 MG: 30 INJECTION, SOLUTION INTRAMUSCULAR at 11:07

## 2024-11-22 NOTE — DISCHARGE INSTRUCTIONS
Follow-up with the orthopedic doctor listed for recheck    Use the Ace wrap and crutches for comfort    Please return with any worsening symptoms question comments or concerns

## 2024-11-22 NOTE — Clinical Note
Kary Sandoval was seen and treated in our emergency department on 11/22/2024.                Diagnosis: Right knee injury    Kary  .    She may return on this date: 11/25/2024         If you have any questions or concerns, please don't hesitate to call.      Han Castillo PA-C    ______________________________           _______________          _______________  Hospital Representative                              Date                                Time
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
## 2024-11-27 ENCOUNTER — OFFICE VISIT (OUTPATIENT)
Dept: OBGYN CLINIC | Facility: CLINIC | Age: 34
End: 2024-11-27
Payer: COMMERCIAL

## 2024-11-27 VITALS
RESPIRATION RATE: 16 BRPM | BODY MASS INDEX: 53.99 KG/M2 | SYSTOLIC BLOOD PRESSURE: 124 MMHG | WEIGHT: 275 LBS | TEMPERATURE: 98.2 F | DIASTOLIC BLOOD PRESSURE: 82 MMHG | OXYGEN SATURATION: 96 % | HEIGHT: 60 IN | HEART RATE: 96 BPM

## 2024-11-27 DIAGNOSIS — E66.813 CLASS 3 SEVERE OBESITY WITH BODY MASS INDEX (BMI) OF 50.0 TO 59.9 IN ADULT, UNSPECIFIED OBESITY TYPE, UNSPECIFIED WHETHER SERIOUS COMORBIDITY PRESENT (HCC): ICD-10-CM

## 2024-11-27 DIAGNOSIS — S80.01XA PATELLAR CONTUSION, RIGHT, INITIAL ENCOUNTER: Primary | ICD-10-CM

## 2024-11-27 DIAGNOSIS — E66.01 CLASS 3 SEVERE OBESITY WITH BODY MASS INDEX (BMI) OF 50.0 TO 59.9 IN ADULT, UNSPECIFIED OBESITY TYPE, UNSPECIFIED WHETHER SERIOUS COMORBIDITY PRESENT (HCC): ICD-10-CM

## 2024-11-27 PROCEDURE — 99214 OFFICE O/P EST MOD 30 MIN: CPT | Performed by: STUDENT IN AN ORGANIZED HEALTH CARE EDUCATION/TRAINING PROGRAM

## 2024-11-27 RX ORDER — NAPROXEN 500 MG/1
500 TABLET ORAL 2 TIMES DAILY WITH MEALS
Qty: 42 TABLET | Refills: 0 | Status: SHIPPED | OUTPATIENT
Start: 2024-11-27 | End: 2024-12-18

## 2024-11-27 NOTE — ASSESSMENT & PLAN NOTE
Kary Sandoval is a 34 y.o. year old female with new onset right knee pain.  We had a shared decision making discussion about their knee injury today.  Based on the history, physical exam, and current state of the knee, I am concerned for a patellar contusion.  Therefore, I would like to proceed with anti-inflammatory treatments and physical therapy.  Given the current state of her knee her exam is significantly limited but no significant posterior drawer or evidence of instability.  Extensor mechanism intact.  We provided her with a new prescription for naproxen to be taken twice a day for the next 3 weeks and she was instructed to start physical therapy and physical therapy prescription was provided for her.  She will follow-up with me in 6 weeks for repeat clinical evaluation if significant change on exam or worsening pain we will consider MRI at that time.        Orders:    naproxen (Naprosyn) 500 mg tablet; Take 1 tablet (500 mg total) by mouth 2 (two) times a day with meals for 21 days    Ambulatory Referral to Physical Therapy; Future
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
## 2024-11-27 NOTE — LETTER
November 27, 2024     Patient: Kary Sandoval  YOB: 1990  Date of Visit: 11/27/2024      To Whom it May Concern:    Kray Sandoval is under my professional care. Kary was seen in my office on 11/27/2024. Kary may return to work with limitations no prolonged standing for >15 minutes, no walking, no carrying or lifting . Duration of restrictions to be re-evaluated in 6 weeks after completing treatments such as physical therapy.     If you have any questions or concerns, please don't hesitate to call.         Sincerely,          Eliot Kessler MD        CC: No Recipients

## 2024-11-29 ENCOUNTER — APPOINTMENT (EMERGENCY)
Dept: NON INVASIVE DIAGNOSTICS | Facility: HOSPITAL | Age: 34
End: 2024-11-29
Payer: COMMERCIAL

## 2024-11-29 ENCOUNTER — APPOINTMENT (EMERGENCY)
Dept: CT IMAGING | Facility: HOSPITAL | Age: 34
End: 2024-11-29
Payer: COMMERCIAL

## 2024-11-29 ENCOUNTER — HOSPITAL ENCOUNTER (EMERGENCY)
Facility: HOSPITAL | Age: 34
Discharge: HOME | End: 2024-11-29
Attending: EMERGENCY MEDICINE
Payer: COMMERCIAL

## 2024-11-29 ENCOUNTER — TELEPHONE (OUTPATIENT)
Age: 34
End: 2024-11-29

## 2024-11-29 VITALS
DIASTOLIC BLOOD PRESSURE: 82 MMHG | TEMPERATURE: 97.9 F | OXYGEN SATURATION: 98 % | SYSTOLIC BLOOD PRESSURE: 128 MMHG | BODY MASS INDEX: 53.99 KG/M2 | RESPIRATION RATE: 16 BRPM | HEART RATE: 72 BPM | HEIGHT: 60 IN | WEIGHT: 275 LBS

## 2024-11-29 DIAGNOSIS — M70.51 BURSITIS OF RIGHT KNEE: Primary | ICD-10-CM

## 2024-11-29 LAB
EXT PREGNANCY TEST URINE: NEGATIVE
EXT. CONTROL: NORMAL

## 2024-11-29 PROCEDURE — 93971 EXTREMITY STUDY: CPT

## 2024-11-29 PROCEDURE — 73700 CT LOWER EXTREMITY W/O DYE: CPT

## 2024-11-29 PROCEDURE — 99284 EMERGENCY DEPT VISIT MOD MDM: CPT | Performed by: PHYSICIAN ASSISTANT

## 2024-11-29 PROCEDURE — 81025 URINE PREGNANCY TEST: CPT | Performed by: PHYSICIAN ASSISTANT

## 2024-11-29 PROCEDURE — 99283 EMERGENCY DEPT VISIT LOW MDM: CPT

## 2024-11-29 NOTE — TELEPHONE ENCOUNTER
Caller: Patient    Doctor: Checo Hartman    Reason for call: Patient states since he last apt on 11/27, her right leg and ankle have been swelling and in pain a lot from her knee injury. Patient says she has been staying off of it and propping it up but it is getting worse. I offered patient an apt, but she wanted to know what the Dr thinks she should do instead. Please advise.    Call back#: [phone]

## 2024-11-29 NOTE — TELEPHONE ENCOUNTER
Called and spoke w/pt and relayed Dr Kessler's smg. States she is following Dr Kessler's advice.  States she was not able to get into PT until 12/12/24 and is on a cancellation list.  States she tried for [facility] but they were scheduling into 2025 so this appt is the earliest in [place]. She will CB if does not improve for earlier appt than 1/8/25.  Thank you.
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
## 2024-11-30 PROCEDURE — 93971 EXTREMITY STUDY: CPT | Performed by: STUDENT IN AN ORGANIZED HEALTH CARE EDUCATION/TRAINING PROGRAM
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
## 2024-12-12 ENCOUNTER — EVALUATION (OUTPATIENT)
Dept: PHYSICAL THERAPY | Facility: CLINIC | Age: 34
End: 2024-12-12
Payer: COMMERCIAL

## 2024-12-12 DIAGNOSIS — S80.01XD PATELLAR CONTUSION, RIGHT, SUBSEQUENT ENCOUNTER: Primary | ICD-10-CM

## 2024-12-12 DIAGNOSIS — S80.01XA PATELLAR CONTUSION, RIGHT, INITIAL ENCOUNTER: ICD-10-CM

## 2024-12-12 PROCEDURE — 97161 PT EVAL LOW COMPLEX 20 MIN: CPT | Performed by: PHYSICAL MEDICINE & REHABILITATION

## 2024-12-12 PROCEDURE — 97110 THERAPEUTIC EXERCISES: CPT | Performed by: PHYSICAL MEDICINE & REHABILITATION

## 2024-12-16 ENCOUNTER — APPOINTMENT (OUTPATIENT)
Dept: PHYSICAL THERAPY | Facility: CLINIC | Age: 34
End: 2024-12-16
Payer: COMMERCIAL

## 2024-12-28 NOTE — PROGRESS NOTES
"Daily Note     Today's date: 2024  Patient name: Kary Sandoval  : 1990  MRN: [id number]  Referring provider: Eliot Kessler,*  Dx:   Encounter Diagnosis     ICD-10-CM    1. Patellar contusion, right, subsequent encounter  S80.01XD       2. Patellar contusion, right, initial encounter  S80.01XA                      Subjective:       Objective: See treatment diary below      Assessment: Tolerated treatment {Tolerated treatment :[id number]}. Patient would benefit from continued PT      Plan: Continue per plan of care.      Precautions: depression    Re-eval Date: 25    Date       Visit Count 1 2      FOTO Comp        Pain In See eval /10      Pain Out  /10            Manuals                                       Neuro Re-Ed        sidestepping        Tandem stand        Tandem amb.                                        Ther Ex        Nustep        QS 10 x 5\" 5\" 2x10      Heel slide 20x 2x10      SAQs 20x 2x10      Sup abd/add 20x 2x10      SLRs 2 x 10 2x10      Leg press        Knee ext mach        Knee flex mach        Mini squats        Step ups                                                Ther Activity                        Gait Training                        Modalities                             "

## 2024-12-30 ENCOUNTER — APPOINTMENT (OUTPATIENT)
Dept: PHYSICAL THERAPY | Facility: CLINIC | Age: 34
End: 2024-12-30
Payer: COMMERCIAL

## 2025-01-08 ENCOUNTER — OFFICE VISIT (OUTPATIENT)
Dept: OBGYN CLINIC | Facility: CLINIC | Age: 35
End: 2025-01-08
Payer: COMMERCIAL

## 2025-01-08 ENCOUNTER — TELEPHONE (OUTPATIENT)
Dept: OBGYN CLINIC | Facility: CLINIC | Age: 35
End: 2025-01-08

## 2025-01-08 VITALS
OXYGEN SATURATION: 99 % | BODY MASS INDEX: 55.32 KG/M2 | WEIGHT: 281.8 LBS | HEART RATE: 78 BPM | HEIGHT: 60 IN | RESPIRATION RATE: 16 BRPM | TEMPERATURE: 97.7 F

## 2025-01-08 DIAGNOSIS — S80.01XD CONTUSION OF RIGHT PATELLA, SUBSEQUENT ENCOUNTER: Primary | ICD-10-CM

## 2025-01-08 PROCEDURE — 99213 OFFICE O/P EST LOW 20 MIN: CPT | Performed by: STUDENT IN AN ORGANIZED HEALTH CARE EDUCATION/TRAINING PROGRAM

## 2025-01-08 NOTE — ASSESSMENT & PLAN NOTE
34-year-old female with resolved right knee pain likely secondary to patellar contusion.  No pain and benign exam today.  Patient cleared to go back to work without restrictions.  She can follow-up with me as needed.

## 2025-01-08 NOTE — LETTER
January 8, 2025     Patient: Kary Sandoval  YOB: 1990  Date of Visit: 1/8/2025      To Whom it May Concern:    Kary Sandoval is under my professional care. Kary was seen in my office on 1/8/2025. Kary may return to work on 1/11/2025 .    If you have any questions or concerns, please don't hesitate to call.         Sincerely,          Eliot Kessler MD        CC: No Recipients
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
## 2025-02-12 ENCOUNTER — HOSPITAL ENCOUNTER (EMERGENCY)
Facility: HOSPITAL | Age: 35
Discharge: HOME | End: 2025-02-12
Attending: EMERGENCY MEDICINE
Payer: COMMERCIAL

## 2025-02-12 ENCOUNTER — APPOINTMENT (EMERGENCY)
Dept: RADIOLOGY | Facility: HOSPITAL | Age: 35
End: 2025-02-12
Payer: COMMERCIAL

## 2025-02-12 VITALS
DIASTOLIC BLOOD PRESSURE: 93 MMHG | BODY MASS INDEX: 54.1 KG/M2 | WEIGHT: 277 LBS | TEMPERATURE: 99 F | SYSTOLIC BLOOD PRESSURE: 129 MMHG | RESPIRATION RATE: 18 BRPM | HEART RATE: 102 BPM | OXYGEN SATURATION: 96 %

## 2025-02-12 DIAGNOSIS — U07.1 COVID-19: Primary | ICD-10-CM

## 2025-02-12 LAB
BACTERIA UR QL AUTO: NORMAL /HPF
BILIRUB UR QL STRIP: NEGATIVE
CLARITY UR: CLEAR
COLOR UR: COLORLESS
EXT PREGNANCY TEST URINE: NEGATIVE
EXT. CONTROL: NORMAL
FLUAV AG UPPER RESP QL IA.RAPID: NEGATIVE
FLUBV AG UPPER RESP QL IA.RAPID: NEGATIVE
GLUCOSE UR STRIP-MCNC: NEGATIVE MG/DL
HGB UR QL STRIP.AUTO: ABNORMAL
KETONES UR STRIP-MCNC: NEGATIVE MG/DL
LEUKOCYTE ESTERASE UR QL STRIP: NEGATIVE
NITRITE UR QL STRIP: NEGATIVE
NON-SQ EPI CELLS URNS QL MICRO: NORMAL /HPF
PH UR STRIP.AUTO: 6 [PH]
PROT UR STRIP-MCNC: NEGATIVE MG/DL
RBC #/AREA URNS AUTO: NORMAL /HPF
S PYO DNA THROAT QL NAA+PROBE: NOT DETECTED
SARS-COV+SARS-COV-2 AG RESP QL IA.RAPID: POSITIVE
SP GR UR STRIP.AUTO: <1.005 (ref 1–1.03)
UROBILINOGEN UR STRIP-ACNC: <2 MG/DL
WBC #/AREA URNS AUTO: NORMAL /HPF

## 2025-02-12 PROCEDURE — 99283 EMERGENCY DEPT VISIT LOW MDM: CPT

## 2025-02-12 PROCEDURE — 99284 EMERGENCY DEPT VISIT MOD MDM: CPT | Performed by: PHYSICIAN ASSISTANT

## 2025-02-12 PROCEDURE — 96372 THER/PROPH/DIAG INJ SC/IM: CPT

## 2025-02-12 PROCEDURE — 87804 INFLUENZA ASSAY W/OPTIC: CPT | Performed by: PHYSICIAN ASSISTANT

## 2025-02-12 PROCEDURE — 87811 SARS-COV-2 COVID19 W/OPTIC: CPT | Performed by: PHYSICIAN ASSISTANT

## 2025-02-12 PROCEDURE — 71045 X-RAY EXAM CHEST 1 VIEW: CPT

## 2025-02-12 PROCEDURE — 87651 STREP A DNA AMP PROBE: CPT | Performed by: PHYSICIAN ASSISTANT

## 2025-02-12 PROCEDURE — 81001 URINALYSIS AUTO W/SCOPE: CPT | Performed by: PHYSICIAN ASSISTANT

## 2025-02-12 PROCEDURE — 81025 URINE PREGNANCY TEST: CPT | Performed by: PHYSICIAN ASSISTANT

## 2025-02-12 RX ORDER — ONDANSETRON 4 MG/1
4 TABLET, ORALLY DISINTEGRATING ORAL EVERY 6 HOURS PRN
Qty: 20 TABLET | Refills: 0 | Status: SHIPPED | OUTPATIENT
Start: 2025-02-12

## 2025-02-12 RX ORDER — BENZONATATE 100 MG/1
100 CAPSULE ORAL 3 TIMES DAILY PRN
Qty: 20 CAPSULE | Refills: 0 | Status: SHIPPED | OUTPATIENT
Start: 2025-02-12

## 2025-02-12 RX ORDER — KETOROLAC TROMETHAMINE 30 MG/ML
15 INJECTION, SOLUTION INTRAMUSCULAR; INTRAVENOUS ONCE
Status: COMPLETED | OUTPATIENT
Start: 2025-02-12 | End: 2025-02-12

## 2025-02-12 RX ADMIN — KETOROLAC TROMETHAMINE 15 MG: 30 INJECTION, SOLUTION INTRAMUSCULAR at 19:30

## 2025-02-12 NOTE — ED PROVIDER NOTES
Time reflects when diagnosis was documented in both MDM as applicable and the Disposition within this note       Time User Action Codes Description Comment    2/12/2025  7:47 PM Kacey Biggs Add [U07.1] COVID-19           ED Disposition       ED Disposition   Discharge    Condition   Stable    Date/Time   Wed Feb 12, 2025  7:47 PM    Comment   Kary Sandoval discharge to home/self care.                   Assessment & Plan       Medical Decision Making  Patient is a 34-year-old female presenting to the ED for evaluation of flulike symptoms x 2 days.    Patient tested positive for COVID.  Strep swab negative.  Chest x-ray shows no evidence of pneumonia or other acute cardiopulmonary disease.  We discussed supportive care measures and symptomatic management in detail.  A prescription for Zofran and Tessalon Perles was sent to patient's pharmacy. Advised close follow-up with PCP or return to the ED for any new/worsening symptoms.     The management plan was discussed in detail with the patient at bedside and all questions were answered. Strict ED return instructions were discussed at bedside. Prior to discharge, both verbal and written instructions were provided. We discussed the signs and symptoms that should prompt the patient to return to the ED. All questions were answered and the patient was comfortable with the plan of care and discharged home. The patient agrees to return to the Emergency Department for concerns and/or progression of illness.     Amount and/or Complexity of Data Reviewed  Labs: ordered. Decision-making details documented in ED Course.  Radiology: ordered.    Risk  Prescription drug management.        ED Course as of 02/13/25 1015   Wed Feb 12, 2025 1946 SARS COV Rapid Antigen(!): Positive       Medications   ketorolac (TORADOL) injection 15 mg (15 mg Intramuscular Given 2/12/25 1930)       ED Risk Strat Scores                          SBIRT 22yo+      Flowsheet Row Most Recent Value    Initial Alcohol Screen: US AUDIT-C     1. How often do you have a drink containing alcohol? 0 Filed at: 02/12/2025 1851   2. How many drinks containing alcohol do you have on a typical day you are drinking?  0 Filed at: 02/12/2025 1851   3a. Male UNDER 65: How often do you have five or more drinks on one occasion? 0 Filed at: 02/12/2025 1851   3b. FEMALE Any Age, or MALE 65+: How often do you have 4 or more drinks on one occassion? 0 Filed at: 02/12/2025 1851   Audit-C Score 0 Filed at: 02/12/2025 1851   WEI: How many times in the past year have you...    Used an illegal drug or used a prescription medication for non-medical reasons? Never Filed at: 02/12/2025 1851                            History of Present Illness       Chief Complaint   Patient presents with    Flu Symptoms     Patient reports a cough, headache and body aches for 2 days.        Past Medical History:   Diagnosis Date    Arthritis     Depressed       Past Surgical History:   Procedure Laterality Date    CHOLANGIOGRAM N/A 09/21/2019    Procedure: CHOLANGIOGRAM;  Surgeon: Eliot Ramesh MD;  Location: [facility];  Service: General    CHOLECYSTECTOMY LAPAROSCOPIC N/A 09/21/2019    Procedure: CHOLECYSTECTOMY LAPAROSCOPIC;  Surgeon: Eliot Ramesh MD;  Location: [facility];  Service: General    FRACTURE SURGERY        Family History   Problem Relation Age of Onset    Cancer Maternal Uncle     Cancer Maternal Grandfather     Cancer Paternal Grandmother       Social History     Tobacco Use    Smoking status: Every Day     Current packs/day: 0.50     Types: Cigarettes    Smokeless tobacco: Never   Vaping Use    Vaping status: Never Used   Substance Use Topics    Alcohol use: Not Currently     Alcohol/week: 0.0 standard drinks of alcohol    Drug use: No      E-Cigarette/Vaping    E-Cigarette Use Never User       E-Cigarette/Vaping Substances    Nicotine No     THC No     CBD No     Flavoring No     Other No     Unknown No       I have reviewed  and agree with the history as documented.     Patient is a 34-year-old female presenting to the ED for evaluation of flulike symptoms x 2 days.  Patient states that she has had a cough, congestion, sore throat, headaches, body aches, subjective fevers, chills and fatigue for the past 2 days.  She denies any chest pain or shortness of breath. She has been taking an over-the-counter cold/flu medication with some relief.  Patient states that the symptoms feel very similar to when she had COVID a few years ago.        Review of Systems   Constitutional:  Positive for chills, fatigue and fever (subjective).   HENT:  Positive for congestion, rhinorrhea and sore throat.    Eyes:  Negative for visual disturbance.   Respiratory:  Positive for cough. Negative for shortness of breath.    Cardiovascular:  Negative for chest pain, palpitations and leg swelling.   Gastrointestinal:  Positive for nausea. Negative for abdominal pain, constipation, diarrhea and vomiting.   Genitourinary:  Negative for dysuria, flank pain and hematuria.   Musculoskeletal:  Positive for myalgias. Negative for back pain and neck pain.   Skin:  Negative for rash.   Neurological:  Positive for headaches. Negative for dizziness and syncope.   All other systems reviewed and are negative.          Objective       ED Triage Vitals [02/12/25 1849]   Temperature Pulse Blood Pressure Respirations SpO2 Patient Position - Orthostatic VS   99 °F (37.2 °C) (!) 130 129/93 18 97 % Sitting      Temp Source Heart Rate Source BP Location FiO2 (%) Pain Score    Temporal Monitor Right arm -- 8      Vitals      Date and Time Temp Pulse SpO2 Resp BP Pain Score FACES Pain Rating User   02/12/25 1946 -- 102 96 % 18 -- -- --    02/12/25 1930 -- -- -- -- -- 9 --    02/12/25 1901 -- 111 -- -- -- -- --    02/12/25 1849 99 °F (37.2 °C) 130 97 % 18 129/93 8 -- SK            Physical Exam  Vitals and nursing note reviewed.   Constitutional:       General: She is awake.       Appearance: Normal appearance. She is well-developed. She is not toxic-appearing or diaphoretic.   HENT:      Head: Normocephalic and atraumatic.      Right Ear: External ear normal.      Left Ear: External ear normal.      Nose: Nose normal.      Mouth/Throat:      Lips: Pink.      Mouth: Mucous membranes are moist.   Eyes:      General: Lids are normal. No scleral icterus.     Conjunctiva/sclera: Conjunctivae normal.      Pupils: Pupils are equal, round, and reactive to light.   Cardiovascular:      Rate and Rhythm: Normal rate and regular rhythm.      Pulses: Normal pulses.           Radial pulses are 2+ on the right side and 2+ on the left side.      Heart sounds: Normal heart sounds, S1 normal and S2 normal.   Pulmonary:      Effort: Pulmonary effort is normal. No accessory muscle usage.      Breath sounds: Normal breath sounds. No stridor. No decreased breath sounds, wheezing, rhonchi or rales.   Abdominal:      General: Abdomen is flat. Bowel sounds are normal. There is no distension.      Palpations: Abdomen is soft.      Tenderness: There is no abdominal tenderness. There is no right CVA tenderness, left CVA tenderness, guarding or rebound.   Musculoskeletal:      Cervical back: Full passive range of motion without pain and neck supple. No signs of trauma. No pain with movement.      Right lower leg: No edema.      Left lower leg: No edema.   Lymphadenopathy:      Cervical: No cervical adenopathy.   Skin:     General: Skin is warm and dry.      Capillary Refill: Capillary refill takes less than 2 seconds.      Coloration: Skin is not cyanotic, jaundiced or pale.   Neurological:      Mental Status: She is alert and oriented to person, place, and time.      GCS: GCS eye subscore is 4. GCS verbal subscore is 5. GCS motor subscore is 6.      Gait: Gait normal.   Psychiatric:         Mood and Affect: Mood normal.         Speech: Speech normal.         Behavior: Behavior is cooperative.         Results Reviewed        Procedure Component Value Units Date/Time    Strep A PCR [[id number]]  (Normal) Collected: 02/12/25 1912    Lab Status: Final result Specimen: Throat Updated: 02/12/25 1950     STREP A PCR Not Detected    Urine Microscopic [[id number]]  (Normal) Collected: 02/12/25 1912    Lab Status: Final result Specimen: Urine, Clean Catch Updated: 02/12/25 1944     RBC, UA 0-1 /hpf      WBC, UA 0-1 /hpf      Epithelial Cells Occasional /hpf      Bacteria, UA Occasional /hpf     FLU/COVID Rapid Antigen (30 min. TAT) - Preferred screening test in ED [[id number]]  (Abnormal) Collected: 02/12/25 1912    Lab Status: Final result Specimen: Nares from Nose Updated: 02/12/25 1939     SARS COV Rapid Antigen Positive     Influenza A Rapid Antigen Negative     Influenza B Rapid Antigen Negative    Narrative:      This test has been performed using the [employer] Luly 2 FLU+SARS Antigen test under the Emergency Use Authorization (EUA). This test has been validated by the  and verified by the performing laboratory. The Luly uses lateral flow immunofluorescent sandwich assay to detect SARS-COV, Influenza A and Influenza B Antigen.     The [employer] Luly 2 SARS Antigen test does not differentiate between SARS-CoV and SARS-CoV-2.     Negative results are presumptive and may be confirmed with a molecular assay, if necessary, for patient management. Negative results do not rule out SARS-CoV-2 or influenza infection and should not be used as the sole basis for treatment or patient management decisions. A negative test result may occur if the level of antigen in a sample is below the limit of detection of this test.     Positive results are indicative of the presence of viral antigens, but do not rule out bacterial infection or co-infection with other viruses.     All test results should be used as an adjunct to clinical observations and other information available to the provider.    FOR PEDIATRIC PATIENTS - copy/paste COVID Guidelines  URL to browser: [email or web address]    UA w Reflex to Microscopic w Reflex to Culture [[id number]]  (Abnormal) Collected: 02/12/25 1912    Lab Status: Final result Specimen: Urine, Clean Catch Updated: 02/12/25 1925     Color, UA Colorless     Clarity, UA Clear     Specific Gravity, UA <1.005     pH, UA 6.0     Leukocytes, UA Negative     Nitrite, UA Negative     Protein, UA Negative mg/dl      Glucose, UA Negative mg/dl      Ketones, UA Negative mg/dl      Urobilinogen, UA <2.0 mg/dl      Bilirubin, UA Negative     Occult Blood, UA Small    POCT pregnancy, urine [[id number]]  (Normal) Collected: 02/12/25 1920    Lab Status: Final result Updated: 02/12/25 1920     EXT Preg Test, Ur Negative     Control Valid            XR chest 1 view portable   Final Interpretation by Andrey Andres MD (02/12 2359)      No acute cardiopulmonary disease.            Workstation performed: [id number]             Procedures    ED Medication and Procedure Management   Prior to Admission Medications   Prescriptions Last Dose Informant Patient Reported? Taking?   acetaminophen (TYLENOL) 500 mg tablet  Self No No   Sig: Take 1 tablet (500 mg total) by mouth every 6 (six) hours as needed for mild pain   Patient not taking: Reported on 11/22/2024   albuterol (2.5 mg/3 mL) 0.083 % nebulizer solution  Self No No   Sig: Take 3 mL (2.5 mg total) by nebulization every 6 (six) hours as needed for wheezing or shortness of breath   Patient not taking: Reported on 9/16/2024   albuterol (Ventolin HFA) 90 mcg/act inhaler  Self No No   Sig: Inhale 2 puffs every 6 (six) hours as needed for wheezing   Patient not taking: Reported on 9/16/2024   guaiFENesin (MUCINEX) 600 mg 12 hr tablet  Self Yes No   Sig: Take 1,200 mg by mouth every 12 (twelve) hours   Patient not taking: Reported on 9/16/2024   methocarbamol (ROBAXIN) 750 mg tablet  Self No No   Sig: Take 1 tablet (750 mg total) by mouth every 8  (eight) hours as needed for muscle spasms   Patient not taking: Reported on 2024   naproxen (Naprosyn) 500 mg tablet   No No   Sig: Take 1 tablet (500 mg total) by mouth 2 (two) times a day with meals for 21 days   predniSONE 10 mg tablet  Self No No   Si tabs daily x 2 days, 4 tabs daily x 2 days, 3 tabs daily x 2 days, 2 tabs daily x 2 days, 1 tab daily x 2 days   Patient not taking: Reported on 2024      Facility-Administered Medications: None     Discharge Medication List as of 2025  7:53 PM        CONTINUE these medications which have NOT CHANGED    Details   acetaminophen (TYLENOL) 500 mg tablet Take 1 tablet (500 mg total) by mouth every 6 (six) hours as needed for mild pain, Starting 2023, Normal      albuterol (2.5 mg/3 mL) 0.083 % nebulizer solution Take 3 mL (2.5 mg total) by nebulization every 6 (six) hours as needed for wheezing or shortness of breath, Starting 2024, Normal      albuterol (Ventolin HFA) 90 mcg/act inhaler Inhale 2 puffs every 6 (six) hours as needed for wheezing, Starting 2024, Normal      guaiFENesin (MUCINEX) 600 mg 12 hr tablet Take 1,200 mg by mouth every 12 (twelve) hours, Historical Med      methocarbamol (ROBAXIN) 750 mg tablet Take 1 tablet (750 mg total) by mouth every 8 (eight) hours as needed for muscle spasms, Starting Sat 3/16/2024, Normal      naproxen (Naprosyn) 500 mg tablet Take 1 tablet (500 mg total) by mouth 2 (two) times a day with meals for 21 days, Starting 2024, Until 2024, Normal      predniSONE 10 mg tablet 5 tabs daily x 2 days, 4 tabs daily x 2 days, 3 tabs daily x 2 days, 2 tabs daily x 2 days, 1 tab daily x 2 days, Normal           No discharge procedures on file.  ED SEPSIS DOCUMENTATION   Time reflects when diagnosis was documented in both MDM as applicable and the Disposition within this note       Time User Action Codes Description Comment    2025  7:47 PM Kacey Biggs Add  [U07.1] COVID-19                  Kacey Biggs PA-C  02/13/25 1015

## 2025-03-14 ENCOUNTER — APPOINTMENT (OUTPATIENT)
Dept: URGENT CARE | Facility: CLINIC | Age: 35
End: 2025-03-14

## 2025-04-10 ENCOUNTER — OFFICE VISIT (OUTPATIENT)
Dept: FAMILY MEDICINE CLINIC | Facility: HOME HEALTHCARE | Age: 35
End: 2025-04-10
Payer: COMMERCIAL

## 2025-04-10 VITALS
SYSTOLIC BLOOD PRESSURE: 126 MMHG | RESPIRATION RATE: 18 BRPM | OXYGEN SATURATION: 96 % | WEIGHT: 279.2 LBS | TEMPERATURE: 98.1 F | HEIGHT: 60 IN | HEART RATE: 88 BPM | BODY MASS INDEX: 54.81 KG/M2 | DIASTOLIC BLOOD PRESSURE: 78 MMHG

## 2025-04-10 DIAGNOSIS — Z00.00 ANNUAL PHYSICAL EXAM: Primary | ICD-10-CM

## 2025-04-10 DIAGNOSIS — E66.01 MORBID OBESITY WITH BMI OF 50.0-59.9, ADULT (HCC): ICD-10-CM

## 2025-04-10 DIAGNOSIS — Z01.00 VISION SCREEN WITHOUT ABNORMAL FINDINGS: ICD-10-CM

## 2025-04-10 PROBLEM — J40 BRONCHITIS: Status: RESOLVED | Noted: 2024-01-30 | Resolved: 2025-04-10

## 2025-04-10 PROBLEM — E66.813 CLASS 3 SEVERE OBESITY WITH BODY MASS INDEX (BMI) OF 50.0 TO 59.9 IN ADULT: Status: RESOLVED | Noted: 2024-11-27 | Resolved: 2025-04-10

## 2025-04-10 PROCEDURE — T1015 CLINIC SERVICE: HCPCS | Performed by: FAMILY MEDICINE

## 2025-04-10 PROCEDURE — 99395 PREV VISIT EST AGE 18-39: CPT | Performed by: PHYSICIAN ASSISTANT

## 2025-04-10 NOTE — PATIENT INSTRUCTIONS
"Patient Education     Routine physical for adults   The Basics   Written by the doctors and editors at [facility]   What is a physical? -- A physical is a routine visit, or \"check-up,\" with your doctor. You might also hear it called a \"wellness visit\" or \"preventive visit.\"  During each visit, the doctor will:   Ask about your physical and mental health   Ask about your habits, behaviors, and lifestyle   Do an exam   Give you vaccines if needed   Talk to you about any medicines you take   Give advice about your health   Answer your questions  Getting regular check-ups is an important part of taking care of your health. It can help your doctor find and treat any problems you have. But it's also important for preventing health problems.  A routine physical is different from a \"sick visit.\" A sick visit is when you see a doctor because of a health concern or problem. Since physicals are scheduled ahead of time, you can think about what you want to ask the doctor.  How often should I get a physical? -- It depends on your age and health. In general, for people age 21 years and older:   If you are younger than 50 years, you might be able to get a physical every 3 years.   If you are 50 years or older, your doctor might recommend a physical every year.  If you have an ongoing health condition, like diabetes or high blood pressure, your doctor will probably want to see you more often.  What happens during a physical? -- In general, each visit will include:   Physical exam - The doctor or nurse will check your height, weight, heart rate, and blood pressure. They will also look at your eyes and ears. They will ask about how you are feeling and whether you have any symptoms that bother you.   Medicines - It's a good idea to bring a list of all the medicines you take to each doctor visit. Your doctor will talk to you about your medicines and answer any questions. Tell them if you are having any side effects that bother you. You " "should also tell them if you are having trouble paying for any of your medicines.   Habits and behaviors - This includes:   Your diet   Your exercise habits   Whether you smoke, drink alcohol, or use drugs   Whether you are sexually active   Whether you feel safe at home  Your doctor will talk to you about things you can do to improve your health and lower your risk of health problems. They will also offer help and support. For example, if you want to quit smoking, they can give you advice and might prescribe medicines. If you want to improve your diet or get more physical activity, they can help you with this, too.   Lab tests, if needed - The tests you get will depend on your age and situation. For example, your doctor might want to check your:   Cholesterol   Blood sugar   Iron level   Vaccines - The recommended vaccines will depend on your age, health, and what vaccines you already had. Vaccines are very important because they can prevent certain serious or deadly infections.   Discussion of screening - \"Screening\" means checking for diseases or other health problems before they cause symptoms. Your doctor can recommend screening based on your age, risk, and preferences. This might include tests to check for:   Cancer, such as breast, prostate, cervical, ovarian, colorectal, prostate, lung, or skin cancer   Sexually transmitted infections, such as chlamydia and gonorrhea   Mental health conditions like depression and anxiety  Your doctor will talk to you about the different types of screening tests. They can help you decide which screenings to have. They can also explain what the results might mean.   Answering questions - The physical is a good time to ask the doctor or nurse questions about your health. If needed, they can refer you to other doctors or specialists, too.  Adults older than 65 years often need other care, too. As you get older, your doctor will talk to you about:   How to prevent falling at " home   Hearing or vision tests   Memory testing   How to take your medicines safely   Making sure that you have the help and support you need at home  All topics are updated as new evidence becomes available and our peer review process is complete.  This topic retrieved from [employer] on: May 02, 2024.  Topic 151690 Version 1.0  Release: 32.4.3 - C32.122  © 2024 UpToDate, Inc. and/or its affiliates. All rights reserved.  Consumer Information Use and Disclaimer   Disclaimer: This generalized information is a limited summary of diagnosis, treatment, and/or medication information. It is not meant to be comprehensive and should be used as a tool to help the user understand and/or assess potential diagnostic and treatment options. It does NOT include all information about conditions, treatments, medications, side effects, or risks that may apply to a specific patient. It is not intended to be medical advice or a substitute for the medical advice, diagnosis, or treatment of a health care provider based on the health care provider's examination and assessment of a patient's specific and unique circumstances. Patients must speak with a health care provider for complete information about their health, medical questions, and treatment options, including any risks or benefits regarding use of medications. This information does not endorse any treatments or medications as safe, effective, or approved for treating a specific patient. UpToDate, Inc. and its affiliates disclaim any warranty or liability relating to this information or the use thereof.The use of this information is governed by the Terms of Use, available at https://www.woltersLoiLouwer.com/en/know/clinical-effectiveness-terms. 2024© UpToDate, Inc. and its affiliates and/or licensors. All rights reserved.  Copyright   © 2024 UpToDate, Inc. and/or its affiliates. All rights reserved.

## 2025-04-10 NOTE — ASSESSMENT & PLAN NOTE
Labs as ordered.  Referral provided to weight management.  Orders:  •  CBC and differential; Future  •  Comprehensive metabolic panel; Future  •  TSH, 3rd generation with Free T4 reflex; Future  •  Lipid panel; Future  •  Hemoglobin A1C; Future  •  Ambulatory Referral to Weight Management; Future

## 2025-04-26 ENCOUNTER — HOSPITAL ENCOUNTER (EMERGENCY)
Facility: HOSPITAL | Age: 35
Discharge: HOME | End: 2025-04-26
Attending: EMERGENCY MEDICINE | Admitting: EMERGENCY MEDICINE
Payer: COMMERCIAL

## 2025-04-26 ENCOUNTER — APPOINTMENT (EMERGENCY)
Dept: CT IMAGING | Facility: HOSPITAL | Age: 35
End: 2025-04-26
Payer: COMMERCIAL

## 2025-04-26 VITALS
RESPIRATION RATE: 18 BRPM | DIASTOLIC BLOOD PRESSURE: 78 MMHG | OXYGEN SATURATION: 97 % | HEART RATE: 94 BPM | TEMPERATURE: 98.2 F | SYSTOLIC BLOOD PRESSURE: 132 MMHG

## 2025-04-26 DIAGNOSIS — N39.0 UTI (URINARY TRACT INFECTION): ICD-10-CM

## 2025-04-26 DIAGNOSIS — R10.9 LEFT FLANK PAIN: ICD-10-CM

## 2025-04-26 DIAGNOSIS — M54.50 ACUTE LEFT-SIDED LOW BACK PAIN WITHOUT SCIATICA: ICD-10-CM

## 2025-04-26 DIAGNOSIS — N12 PYELONEPHRITIS: Primary | ICD-10-CM

## 2025-04-26 LAB
ALBUMIN SERPL BCG-MCNC: 4.3 G/DL (ref 3.5–5)
ALP SERPL-CCNC: 66 U/L (ref 34–104)
ALT SERPL W P-5'-P-CCNC: 11 U/L (ref 7–52)
ANION GAP SERPL CALCULATED.3IONS-SCNC: 8 MMOL/L (ref 4–13)
AST SERPL W P-5'-P-CCNC: 9 U/L (ref 13–39)
BACTERIA UR QL AUTO: ABNORMAL /HPF
BASOPHILS # BLD AUTO: 0.02 THOUSANDS/ÂΜL (ref 0–0.1)
BASOPHILS NFR BLD AUTO: 0 % (ref 0–1)
BILIRUB SERPL-MCNC: 0.33 MG/DL (ref 0.2–1)
BILIRUB UR QL STRIP: NEGATIVE
BUN SERPL-MCNC: 10 MG/DL (ref 5–25)
CALCIUM SERPL-MCNC: 9.5 MG/DL (ref 8.4–10.2)
CHLORIDE SERPL-SCNC: 107 MMOL/L (ref 96–108)
CLARITY UR: CLEAR
CO2 SERPL-SCNC: 24 MMOL/L (ref 21–32)
COLOR UR: YELLOW
CREAT SERPL-MCNC: 0.71 MG/DL (ref 0.6–1.3)
EOSINOPHIL # BLD AUTO: 0.25 THOUSAND/ÂΜL (ref 0–0.61)
EOSINOPHIL NFR BLD AUTO: 3 % (ref 0–6)
ERYTHROCYTE [DISTWIDTH] IN BLOOD BY AUTOMATED COUNT: 15.5 % (ref 11.6–15.1)
EXT PREGNANCY TEST URINE: NEGATIVE
EXT. CONTROL: NORMAL
GFR SERPL CREATININE-BSD FRML MDRD: 109 ML/MIN/1.73SQ M
GLUCOSE SERPL-MCNC: 89 MG/DL (ref 65–140)
GLUCOSE UR STRIP-MCNC: NEGATIVE MG/DL
HCT VFR BLD AUTO: 42.2 % (ref 34.8–46.1)
HGB BLD-MCNC: 12.8 G/DL (ref 11.5–15.4)
HGB UR QL STRIP.AUTO: ABNORMAL
IMM GRANULOCYTES # BLD AUTO: 0.04 THOUSAND/UL (ref 0–0.2)
IMM GRANULOCYTES NFR BLD AUTO: 0 % (ref 0–2)
KETONES UR STRIP-MCNC: NEGATIVE MG/DL
LEUKOCYTE ESTERASE UR QL STRIP: ABNORMAL
LIPASE SERPL-CCNC: 12 U/L (ref 11–82)
LYMPHOCYTES # BLD AUTO: 1.82 THOUSANDS/ÂΜL (ref 0.6–4.47)
LYMPHOCYTES NFR BLD AUTO: 18 % (ref 14–44)
MCH RBC QN AUTO: 22.4 PG (ref 26.8–34.3)
MCHC RBC AUTO-ENTMCNC: 30.3 G/DL (ref 31.4–37.4)
MCV RBC AUTO: 74 FL (ref 82–98)
MONOCYTES # BLD AUTO: 0.62 THOUSAND/ÂΜL (ref 0.17–1.22)
MONOCYTES NFR BLD AUTO: 6 % (ref 4–12)
NEUTROPHILS # BLD AUTO: 7.26 THOUSANDS/ÂΜL (ref 1.85–7.62)
NEUTS SEG NFR BLD AUTO: 73 % (ref 43–75)
NITRITE UR QL STRIP: NEGATIVE
NON-SQ EPI CELLS URNS QL MICRO: ABNORMAL /HPF
NRBC BLD AUTO-RTO: 0 /100 WBCS
PH UR STRIP.AUTO: 5 [PH]
PLATELET # BLD AUTO: 215 THOUSANDS/UL (ref 149–390)
PMV BLD AUTO: 10.5 FL (ref 8.9–12.7)
POTASSIUM SERPL-SCNC: 3.9 MMOL/L (ref 3.5–5.3)
PROT SERPL-MCNC: 7.1 G/DL (ref 6.4–8.4)
PROT UR STRIP-MCNC: ABNORMAL MG/DL
RBC # BLD AUTO: 5.71 MILLION/UL (ref 3.81–5.12)
RBC #/AREA URNS AUTO: ABNORMAL /HPF
SODIUM SERPL-SCNC: 139 MMOL/L (ref 135–147)
SP GR UR STRIP.AUTO: 1.02 (ref 1–1.03)
UROBILINOGEN UR STRIP-ACNC: <2 MG/DL
WBC # BLD AUTO: 10.01 THOUSAND/UL (ref 4.31–10.16)
WBC #/AREA URNS AUTO: ABNORMAL /HPF

## 2025-04-26 PROCEDURE — 87086 URINE CULTURE/COLONY COUNT: CPT

## 2025-04-26 PROCEDURE — 81001 URINALYSIS AUTO W/SCOPE: CPT

## 2025-04-26 PROCEDURE — 99284 EMERGENCY DEPT VISIT MOD MDM: CPT

## 2025-04-26 PROCEDURE — 96374 THER/PROPH/DIAG INJ IV PUSH: CPT

## 2025-04-26 PROCEDURE — 85025 COMPLETE CBC W/AUTO DIFF WBC: CPT

## 2025-04-26 PROCEDURE — 80053 COMPREHEN METABOLIC PANEL: CPT

## 2025-04-26 PROCEDURE — 81025 URINE PREGNANCY TEST: CPT

## 2025-04-26 PROCEDURE — 83690 ASSAY OF LIPASE: CPT

## 2025-04-26 PROCEDURE — 36415 COLL VENOUS BLD VENIPUNCTURE: CPT

## 2025-04-26 PROCEDURE — 74177 CT ABD & PELVIS W/CONTRAST: CPT

## 2025-04-26 RX ORDER — KETOROLAC TROMETHAMINE 30 MG/ML
15 INJECTION, SOLUTION INTRAMUSCULAR; INTRAVENOUS ONCE
Status: COMPLETED | OUTPATIENT
Start: 2025-04-26 | End: 2025-04-26

## 2025-04-26 RX ORDER — PHENAZOPYRIDINE HYDROCHLORIDE 200 MG/1
200 TABLET, FILM COATED ORAL 3 TIMES DAILY PRN
Qty: 10 TABLET | Refills: 0 | Status: SHIPPED | OUTPATIENT
Start: 2025-04-26

## 2025-04-26 RX ORDER — SULFAMETHOXAZOLE AND TRIMETHOPRIM 800; 160 MG/1; MG/1
1 TABLET ORAL 2 TIMES DAILY
Qty: 20 TABLET | Refills: 0 | Status: SHIPPED | OUTPATIENT
Start: 2025-04-26 | End: 2025-05-06

## 2025-04-26 RX ORDER — SULFAMETHOXAZOLE AND TRIMETHOPRIM 800; 160 MG/1; MG/1
1 TABLET ORAL ONCE
Status: COMPLETED | OUTPATIENT
Start: 2025-04-26 | End: 2025-04-26

## 2025-04-26 RX ADMIN — SULFAMETHOXAZOLE AND TRIMETHOPRIM 1 TABLET: 800; 160 TABLET ORAL at 14:14

## 2025-04-26 RX ADMIN — KETOROLAC TROMETHAMINE 15 MG: 30 INJECTION, SOLUTION INTRAMUSCULAR at 11:41

## 2025-04-26 RX ADMIN — IOHEXOL 100 ML: 350 INJECTION, SOLUTION INTRAVENOUS at 12:24

## 2025-04-26 NOTE — DISCHARGE INSTRUCTIONS
Begin taking antibiotics every 12 hours for the next 10 days.  -may alternate Tylenol and Motrin for pain  -may also use heat/ice/topical creams and patches for additional relief.  -pyridium for any urinary discomfort    Follow-up with PCP and return to ED for any worsening symptoms.

## 2025-04-26 NOTE — ED PROVIDER NOTES
"Time reflects when diagnosis was documented in both MDM as applicable and the Disposition within this note       Time User Action Codes Description Comment    4/26/2025 10:57 AM NarRd willamsl Add [M54.50] Acute left-sided low back pain without sciatica     4/26/2025 10:57 AM NarRd willamsl Add [R10.9] Left flank pain     4/26/2025  2:08 PM Naradkin Rocio Add [N12] Pyelonephritis     4/26/2025  2:08 PM NaradkoVickiRocio Add [N39.0] UTI (urinary tract infection)     4/26/2025  2:09 PM Naradko Rocio Modify [M54.50] Acute left-sided low back pain without sciatica     4/26/2025  2:09 PM Rd Mariol Modify [N12] Pyelonephritis           ED Disposition       ED Disposition   Discharge    Condition   Stable    Date/Time   Sat Apr 26, 2025  2:13 PM    Comment   Kayr Sandoval discharge to home/self care.                   Assessment & Plan       Medical Decision Making  Patient is a 35-year-old female presenting for evaluation of left flank and low back pain. Upon examination, patient is sitting on the side of the stretcher and does not appear in acute distress. Vital signs are stable and patient is afebrile. Normal heart and lung sounds noted. Slight tenderness of the LUQ which patient reports radiates towards her back. + left flank pain upon palpation as well as left CVA tenderness.    Differentials include but are not limited to: nephrolithiasis, urinary tract infection, pyelonephritis, and pancreatitis    Blood work obtained which was unremarkable. Stable appearing anemia when compared to previous blood work. Urinalysis does show 10-20 WBC with moderate epithelial and bacteria. CT abdomen/pelvis showing: \"No acute findings in the abdomen or pelvis to explain the patient's left flank and lower back pain. Enhancing foci in the right lobe of the liver measuring 10 mm and 13 mm, in retrospect present as far back as 2015 and therefore statistically most likely to represent benign hemangiomata. Hepatomegaly and " "hepatic steatosis.\"     Results were discussed with patient and discussed treatment for suspected pyelonephritis. The first dose of Bactrim was given in the ED and the prescription was sent to the pharmacy on file. Discussed that she should continue to take the antibiotic every 12 hours for the next 10 days. May use Tylenol and Motrin for pain and pyridium for any dysuria. Should follow-up with PCP and strict ED return precautions reviewed. Patient verbalizes understanding of discharge instructions and follow-up care at this time.     Amount and/or Complexity of Data Reviewed  Labs: ordered. Decision-making details documented in ED Course.     Details: Reviewed   Radiology: ordered.     Details: Reviewed     Risk  Prescription drug management.        ED Course as of 04/26/25 1847   Sat Apr 26, 2025   1152 CBC and differential(!)  No leukocytosis and hemoglobin is stable. Evidence of anemia comparable to previous blood work.    1152 UA w Reflex to Microscopic w Reflex to Culture(!)  + large leukocytes and blood. Patient is currently menstruating.    1209 Lipase  Normal    1209 Comprehensive metabolic panel(!)  No electrolyte imbalance. Normal kidney function.    1211 Urine Microscopic(!)  10-20 WBC with moderate epithelial and bacteria.        Medications   ketorolac (TORADOL) injection 15 mg (15 mg Intravenous Given 4/26/25 1141)   iohexol (OMNIPAQUE) 350 MG/ML injection (MULTI-DOSE) 100 mL (100 mL Intravenous Given 4/26/25 1224)   sulfamethoxazole-trimethoprim (BACTRIM DS) 800-160 mg per tablet 1 tablet (1 tablet Oral Given 4/26/25 1414)       ED Risk Strat Scores                    No data recorded        SBIRT 20yo+      Flowsheet Row Most Recent Value   Initial Alcohol Screen: US AUDIT-C     1. How often do you have a drink containing alcohol? 0 Filed at: 04/26/2025 1035   2. How many drinks containing alcohol do you have on a typical day you are drinking?  0 Filed at: 04/26/2025 1035   3b. FEMALE Any Age, or " MALE 65+: How often do you have 4 or more drinks on one occassion? 0 Filed at: 04/26/2025 1035   Audit-C Score 0 Filed at: 04/26/2025 1035   WEI: How many times in the past year have you...    Used an illegal drug or used a prescription medication for non-medical reasons? Never Filed at: 04/26/2025 1035                            History of Present Illness       Chief Complaint   Patient presents with    Back Pain     Left lower back pain for the past 3 days, denies any recent injuries        Past Medical History:   Diagnosis Date    Arthritis     Depressed       Past Surgical History:   Procedure Laterality Date    CHOLANGIOGRAM N/A 09/21/2019    Procedure: CHOLANGIOGRAM;  Surgeon: Eliot Ramesh MD;  Location: [facility];  Service: General    CHOLECYSTECTOMY LAPAROSCOPIC N/A 09/21/2019    Procedure: CHOLECYSTECTOMY LAPAROSCOPIC;  Surgeon: Eliot Ramesh MD;  Location: [facility];  Service: General    FRACTURE SURGERY        Family History   Problem Relation Age of Onset    Cancer Maternal Uncle     Cancer Maternal Grandfather     Cancer Paternal Grandmother       Social History     Tobacco Use    Smoking status: Every Day     Current packs/day: 0.50     Average packs/day: 0.5 packs/day for 18.3 years (9.2 ttl pk-yrs)     Types: Cigarettes     Start date: 2007    Smokeless tobacco: Never   Vaping Use    Vaping status: Never Used   Substance Use Topics    Alcohol use: Not Currently     Alcohol/week: 0.0 standard drinks of alcohol    Drug use: No      E-Cigarette/Vaping    E-Cigarette Use Never User       E-Cigarette/Vaping Substances    Nicotine No     THC No     CBD No     Flavoring No     Other No     Unknown No       I have reviewed and agree with the history as documented.     Patient is a 35-year-old female with a past medical history including depression, arthritis, cholecystectomy, and high grade squamous intraepithelial lesion of cervix on pap. Presents today for evaluation of low back pain. States  that she first started with left flank pain which had progressed towards her left low back over the last 3 days. Pain does not radiate into her abdomen, pelvic region, or down her legs. She denies any urinary urgency, frequency, dysuria, or hematuria. Has experienced some nausea which is only present while she is eating. She had attempted a heating pad to help with the pain which has not helped. No medications taken for pain. Patient does report a history of sciatica but notes that this does not feel the same. No personal history of kidney stones.           Review of Systems   Constitutional:  Negative for chills and fever.   Respiratory:  Negative for shortness of breath.    Cardiovascular:  Negative for chest pain.   Gastrointestinal:  Positive for nausea (only when eating). Negative for abdominal pain, constipation and vomiting.   Genitourinary:  Positive for flank pain. Negative for dysuria, hematuria and urgency.   Musculoskeletal:  Positive for back pain.   All other systems reviewed and are negative.          Objective       ED Triage Vitals [04/26/25 1033]   Temperature Pulse Blood Pressure Respirations SpO2 Patient Position - Orthostatic VS   98 °F (36.7 °C) 102 136/76 20 95 % Sitting      Temp Source Heart Rate Source BP Location FiO2 (%) Pain Score    Temporal Monitor Right arm -- 8      Vitals      Date and Time Temp Pulse SpO2 Resp BP Pain Score FACES Pain Rating User   04/26/25 1417 98.2 °F (36.8 °C) 94 97 % 18 132/78 6 -- [place]   04/26/25 1200 -- -- -- -- -- 5 -- [place]   04/26/25 1141 -- -- -- -- -- 8 -- [place]   04/26/25 1033 98 °F (36.7 °C) 102 95 % 20 136/76 8 -- [place]            Physical Exam  Vitals and nursing note reviewed.   Constitutional:       Appearance: Normal appearance.   Cardiovascular:      Rate and Rhythm: Tachycardia present.      Heart sounds: Normal heart sounds.   Pulmonary:      Effort: Pulmonary effort is normal.      Breath sounds: Normal breath sounds.   Abdominal:      General: Abdomen  is flat. Bowel sounds are normal.      Palpations: Abdomen is soft.      Tenderness: There is abdominal tenderness in the left upper quadrant. There is left CVA tenderness. There is no right CVA tenderness.          Comments: Patient noting pain moves towards her back with palpation of LUQ.   Musculoskeletal:         General: Tenderness present.        Arms:       Lumbar back: Tenderness present.        Back:    Skin:     General: Skin is warm and dry.      Capillary Refill: Capillary refill takes less than 2 seconds.   Neurological:      General: No focal deficit present.      Mental Status: She is alert and oriented to person, place, and time.   Psychiatric:         Mood and Affect: Mood normal.         Behavior: Behavior normal.         Results Reviewed       Procedure Component Value Units Date/Time    Urine Microscopic [[id number]]  (Abnormal) Collected: 04/26/25 1140    Lab Status: Final result Specimen: Urine, Clean Catch Updated: 04/26/25 1211     RBC, UA 4-10 /hpf      WBC, UA 10-20 /hpf      Epithelial Cells Moderate /hpf      Bacteria, UA Moderate /hpf     Urine culture [[id number]] Collected: 04/26/25 1140    Lab Status: In process Specimen: Urine, Clean Catch Updated: 04/26/25 1211    Comprehensive metabolic panel [[id number]]  (Abnormal) Collected: 04/26/25 1140    Lab Status: Final result Specimen: Blood from Arm, Left Updated: 04/26/25 1207     Sodium 139 mmol/L      Potassium 3.9 mmol/L      Chloride 107 mmol/L      CO2 24 mmol/L      ANION GAP 8 mmol/L      BUN 10 mg/dL      Creatinine 0.71 mg/dL      Glucose 89 mg/dL      Calcium 9.5 mg/dL      AST 9 U/L      ALT 11 U/L      Alkaline Phosphatase 66 U/L      Total Protein 7.1 g/dL      Albumin 4.3 g/dL      Total Bilirubin 0.33 mg/dL      eGFR 109 ml/min/1.73sq m     Narrative:      National Kid[employer] Foundation guidelines for Chronic Kidney Disease (CKD):     Stage 1 with normal or high GFR (GFR > 90 mL/min/1.73 square meters)    Stage 2 Mild  CKD (GFR = 60-89 mL/min/1.73 square meters)    Stage 3A Moderate CKD (GFR = 45-59 mL/min/1.73 square meters)    Stage 3B Moderate CKD (GFR = 30-44 mL/min/1.73 square meters)    Stage 4 Severe CKD (GFR = 15-29 mL/min/1.73 square meters)    Stage 5 End Stage CKD (GFR <15 mL/min/1.73 square meters)  Note: GFR calculation is accurate only with a steady state creatinine    Lipase [[id number]]  (Normal) Collected: 04/26/25 1140    Lab Status: Final result Specimen: Blood from Arm, Left Updated: 04/26/25 1207     Lipase 12 u/L     CBC and differential [[id number]]  (Abnormal) Collected: 04/26/25 1140    Lab Status: Final result Specimen: Blood from Arm, Left Updated: 04/26/25 1149     WBC 10.01 Thousand/uL      RBC 5.71 Million/uL      Hemoglobin 12.8 g/dL      Hematocrit 42.2 %      MCV 74 fL      MCH 22.4 pg      MCHC 30.3 g/dL      RDW 15.5 %      MPV 10.5 fL      Platelets 215 Thousands/uL      nRBC 0 /100 WBCs      Segmented % 73 %      Immature Grans % 0 %      Lymphocytes % 18 %      Monocytes % 6 %      Eosinophils Relative 3 %      Basophils Relative 0 %      Absolute Neutrophils 7.26 Thousands/µL      Absolute Immature Grans 0.04 Thousand/uL      Absolute Lymphocytes 1.82 Thousands/µL      Absolute Monocytes 0.62 Thousand/µL      Eosinophils Absolute 0.25 Thousand/µL      Basophils Absolute 0.02 Thousands/µL     UA w Reflex to Microscopic w Reflex to Culture [[id number]]  (Abnormal) Collected: 04/26/25 1140    Lab Status: Final result Specimen: Urine, Clean Catch Updated: 04/26/25 1149     Color, UA Yellow     Clarity, UA Clear     Specific Gravity, UA 1.020     pH, UA 5.0     Leukocytes, UA Large     Nitrite, UA Negative     Protein, UA Trace mg/dl      Glucose, UA Negative mg/dl      Ketones, UA Negative mg/dl      Urobilinogen, UA <2.0 mg/dl      Bilirubin, UA Negative     Occult Blood, UA Large    POCT pregnancy, urine [[id number]]  (Normal) Collected: 04/26/25 1143    Lab Status: Final result Updated: 04/26/25  1143     EXT Preg Test, Ur Negative     Control Valid            CT abdomen pelvis with contrast   ED Interpretation by KENNY Schmidt (04/26 1805)   CT does not show any acute findings.      Final Interpretation by Maurisio Garnett MD (04/26 5844)      1. No acute findings in the abdomen or pelvis to explain the patient's left flank and lower back pain.      2. Enhancing foci in the right lobe of the liver measuring 10 mm and 13 mm, in retrospect present as far back as 2015 and therefore statistically most likely to represent benign hemangiomata.      3. Hepatomegaly and hepatic steatosis.            Resident: NOEL OVALLE I, the attending radiologist, have reviewed the images and agree with the final report above.      Workstation performed: [id number]             Procedures    ED Medication and Procedure Management   None     Discharge Medication List as of 4/26/2025  2:15 PM        START taking these medications    Details   phenazopyridine (PYRIDIUM) 200 mg tablet Take 1 tablet (200 mg total) by mouth 3 (three) times a day as needed for bladder spasms, Starting Sat 4/26/2025, Normal      sulfamethoxazole-trimethoprim (BACTRIM DS) 800-160 mg per tablet Take 1 tablet by mouth 2 (two) times a day for 10 days smx-tmp DS (BACTRIM) 800-160 mg tabs (1tab q12 D10), Starting Sat 4/26/2025, Until Tue 5/6/2025, Normal           No discharge procedures on file.  ED SEPSIS DOCUMENTATION   Time reflects when diagnosis was documented in both MDM as applicable and the Disposition within this note       Time User Action Codes Description Comment    4/26/2025 10:57 AM Rocio Mario Add [M54.50] Acute left-sided low back pain without sciatica     4/26/2025 10:57 AM Rocio Mario Add [R10.9] Left flank pain     4/26/2025  2:08 PM Rocio Mario Add [N12] Pyelonephritis     4/26/2025  2:08 PM Rocio Mario Add [N39.0] UTI (urinary tract infection)     4/26/2025  2:09 PM Rocio Mario Modify  [M54.50] Acute left-sided low back pain without sciatica     4/26/2025  2:09 PM Rocio Mario Modify [N12] Pyelonephritis                  KENNY Schmidt  04/26/25 1848

## 2025-04-28 LAB — BACTERIA UR CULT: NORMAL

## 2025-06-07 ENCOUNTER — OFFICE VISIT (OUTPATIENT)
Dept: URGENT CARE | Facility: MEDICAL CENTER | Age: 35
End: 2025-06-07
Payer: COMMERCIAL

## 2025-06-07 ENCOUNTER — APPOINTMENT (OUTPATIENT)
Dept: RADIOLOGY | Facility: MEDICAL CENTER | Age: 35
End: 2025-06-07
Payer: COMMERCIAL

## 2025-06-07 VITALS
BODY MASS INDEX: 53.01 KG/M2 | HEIGHT: 60 IN | RESPIRATION RATE: 20 BRPM | WEIGHT: 270 LBS | SYSTOLIC BLOOD PRESSURE: 124 MMHG | TEMPERATURE: 98.5 F | OXYGEN SATURATION: 97 % | DIASTOLIC BLOOD PRESSURE: 78 MMHG | HEART RATE: 85 BPM

## 2025-06-07 DIAGNOSIS — M25.572 ACUTE LEFT ANKLE PAIN: Primary | ICD-10-CM

## 2025-06-07 PROCEDURE — 99212 OFFICE O/P EST SF 10 MIN: CPT

## 2025-06-07 PROCEDURE — 73630 X-RAY EXAM OF FOOT: CPT

## 2025-06-07 NOTE — PATIENT INSTRUCTIONS
Tylenol/Ibuprofen for pain  Wear brace, splint or ACE wrap +/- crutches for support (Remove braces and ACE bandages every 3 hours)  Wear shoe arch support for foot injuries (ex: superfeet insoles)    Ice 20 minutes 3-4 times per day for 3 days  Insulate the skin from the ice to prevent frostbite  Rest and Elevate  Follow up with orthopedic if symptoms do not improve

## 2025-06-07 NOTE — PROGRESS NOTES
[facility]        NAME: Kray Sandoval is a 35 y.o. female  : 1990    MRN: [id number]  DATE: 2025  TIME: 6:12 PM    Assessment and Plan   Acute left ankle pain [M25.572]  1. Acute left ankle pain  XR foot 3+ vw left            Patient Instructions   Tylenol/Ibuprofen for pain  Wear brace, splint or ACE wrap +/- crutches for support (Remove braces and ACE bandages every 3 hours)  Wear shoe arch support for foot injuries (ex: superfeet insoles)    Ice 20 minutes 3-4 times per day for 3 days  Insulate the skin from the ice to prevent frostbite  Rest and Elevate  Follow up with orthopedic if symptoms do not improve    X-ray was negative without fractures or osseous abnormalities.    Follow up with PCP in 3-5 days.  Proceed to  ER if symptoms worsen.    If tests have been performed at [facility], our office will contact you with results if changes need to be made to the care plan discussed with you at the visit.  You can review your full results on [facility].    Chief Complaint     Chief Complaint   Patient presents with    Ankle Injury     Baby stroller fell on patients left ankle today.. Has pain in foot ankle and shooting up leg since         History of Present Illness   Kary Sandoval is a 35 yr old female with no significant past medical history who presents to the urgent care for concerns of left ankle pain. She states that a baby stroller fell directly onto her ankle. She says that she is having pain on both sides and that she is having a shooting pain on both sides of her ankle upwards to her knee. She says that her toes are swollen and that she did ice and motrin. She admits to trying to rest it and she says that the pain is a 6-10. She says that the stroller was around 50 pounds and all metals. She states that she has not injured that foot before and she admits to wrapping it but this is not helping. She says that the swelling is increasing and admits that this occurred this  morning. She also states that there are no other known prior history.       Review of Systems   Review of Systems   Constitutional:  Negative for chills and fever.   HENT:  Negative for ear pain and sore throat.    Eyes:  Negative for pain and visual disturbance.   Respiratory:  Negative for cough and shortness of breath.    Cardiovascular:  Negative for chest pain and palpitations.   Gastrointestinal:  Negative for abdominal pain and vomiting.   Genitourinary:  Negative for dysuria and hematuria.   Musculoskeletal:  Positive for joint swelling. Negative for arthralgias and back pain.        Left ankle pain   Skin:  Negative for color change and rash.   Neurological:  Negative for seizures and syncope.   All other systems reviewed and are negative.        Current Medications     Current Medications[1]    Current Allergies     Allergies as of 06/07/2025 - Reviewed 06/07/2025   Allergen Reaction Noted    Penicillins Anaphylaxis 09/27/2016    Ibuprofen Itching 10/20/2024    Fish-derived products - food allergy  04/28/2017    Shellfish allergy - food allergy Hives 04/28/2017            The following portions of the patient's history were reviewed and updated as appropriate: allergies, current medications, past family history, past medical history, past social history, past surgical history and problem list.     Past Medical History[2]    Past Surgical History[3]    Family History[4]      Medications have been verified.        Objective   /78   Pulse 85   Temp 98.5 °F (36.9 °C)   Resp 20   Ht 5' (1.524 m)   Wt 122 kg (270 lb)   SpO2 97%   BMI 52.73 kg/m²   No LMP recorded.       Physical Exam     Physical Exam  Constitutional:       General: She is not in acute distress.     Appearance: Normal appearance. She is normal weight. She is not ill-appearing, toxic-appearing or diaphoretic.   HENT:      Head: Normocephalic and atraumatic.      Right Ear: Tympanic membrane, ear canal and external ear normal. There  is no impacted cerumen.      Left Ear: Tympanic membrane, ear canal and external ear normal. There is no impacted cerumen.      Nose: No congestion or rhinorrhea.      Mouth/Throat:      Mouth: Mucous membranes are moist.      Pharynx: No oropharyngeal exudate or posterior oropharyngeal erythema.     Eyes:      General: No scleral icterus.        Right eye: No discharge.         Left eye: No discharge.      Extraocular Movements: Extraocular movements intact.      Conjunctiva/sclera: Conjunctivae normal.      Pupils: Pupils are equal, round, and reactive to light.     Neck:      Vascular: No carotid bruit.     Cardiovascular:      Rate and Rhythm: Normal rate and regular rhythm.      Pulses: Normal pulses.      Heart sounds: Normal heart sounds. No murmur heard.     No friction rub. No gallop.   Pulmonary:      Effort: Pulmonary effort is normal. No respiratory distress.      Breath sounds: Normal breath sounds. No stridor. No wheezing, rhonchi or rales.   Chest:      Chest wall: No tenderness.     Musculoskeletal:      Cervical back: Normal range of motion and neck supple. No rigidity or tenderness.      Right foot: Normal. Normal range of motion and normal capillary refill. No swelling, deformity, bunion, Charcot foot, foot drop, prominent metatarsal heads, laceration, tenderness, bony tenderness or crepitus. Normal pulse.      Left foot: Normal range of motion and normal capillary refill. Swelling, tenderness and bony tenderness present. No deformity, bunion, Charcot foot, foot drop, prominent metatarsal heads, laceration or crepitus. Normal pulse.        Legs:       Comments: Ecchymosis present over first great metatarsal and the medial malleolus    Lymphadenopathy:      Cervical: No cervical adenopathy.     Neurological:      Mental Status: She is alert.                          [1]   Current Outpatient Medications:     phenazopyridine (PYRIDIUM) 200 mg tablet, Take 1 tablet (200 mg total) by mouth 3 (three)  times a day as needed for bladder spasms (Patient not taking: Reported on 6/7/2025), Disp: 10 tablet, Rfl: 0  [2]   Past Medical History:  Diagnosis Date    Arthritis     Depressed    [3]   Past Surgical History:  Procedure Laterality Date    CHOLANGIOGRAM N/A 09/21/2019    Procedure: CHOLANGIOGRAM;  Surgeon: Eliot Ramesh MD;  Location: [facility];  Service: General    CHOLECYSTECTOMY LAPAROSCOPIC N/A 09/21/2019    Procedure: CHOLECYSTECTOMY LAPAROSCOPIC;  Surgeon: Eliot Ramesh MD;  Location: [facility];  Service: General    FRACTURE SURGERY     [4]   Family History  Problem Relation Name Age of Onset    Cancer Maternal Uncle      Cancer Maternal Grandfather      Cancer Paternal Grandmother